# Patient Record
Sex: FEMALE | Race: WHITE | NOT HISPANIC OR LATINO | Employment: FULL TIME | URBAN - METROPOLITAN AREA
[De-identification: names, ages, dates, MRNs, and addresses within clinical notes are randomized per-mention and may not be internally consistent; named-entity substitution may affect disease eponyms.]

---

## 2023-10-17 ENCOUNTER — PROCEDURE VISIT (OUTPATIENT)
Dept: SLEEP MEDICINE | Facility: HOSPITAL | Age: 53
End: 2023-10-17
Payer: COMMERCIAL

## 2023-10-17 DIAGNOSIS — G47.33 OBSTRUCTIVE SLEEP APNEA (ADULT) (PEDIATRIC): ICD-10-CM

## 2023-10-17 PROCEDURE — 95811 POLYSOM 6/>YRS CPAP 4/> PARM: CPT | Performed by: INTERNAL MEDICINE

## 2023-10-18 VITALS — BODY MASS INDEX: 35.84 KG/M2 | HEIGHT: 61 IN | RESPIRATION RATE: 18 BRPM | HEART RATE: 75 BPM | WEIGHT: 189.82 LBS

## 2023-10-18 NOTE — PROGRESS NOTES
[facility] TECH NOTE:     Patient: Clarisa Barboza   MRN//AGE: [id number]  1970  53 y.o.   Technologist: Orly Zaldivar   Room: 2   Service Date: 10/18/2023        Sleep Testing Location: [facility]: 18    TECHNOLOGIST SLEEP STUDY PROCEDURE NOTE:   This sleep study is being conducted according to the policies and procedures outlined by the AAS accreditation standards.  The sleep study procedure and processes involved during this appointment was explained to the patient/patient’s family, questions were answered. The patient/family verbalized understanding.      The patient is a 53 y.o. year old female scheduled for a Diagnostic PSG Split night with montage of: PSG She arrived for her appointment.      The study that was ultimately completed was aDiagnostic PSG Split night with montage of: PSG    The full study was completed.  Patient questionnaires completed?: yes     Consents signed? yes    Initial Fall Risk Screening:     Clarisa has not fallen in the last 6 months. She did not result in injury. Clarisa does not have a fear of falling. She does not need assistance with sitting, standing, or walking. She does not need assistance walking in her home. She does not need assistance in an unfamiliar setting. The patient is not using an assistive device.     Brief Study observations: The patient was diagnosed with sleep apnea over a year ago. She is not currently using CPAP. The patient was referred for a split night study. After two hours of sleep, the patient's RDI was greater then 10. Per Protocol, the patient qualified to be split. Pressure was started at 5 cm. The final pressure was 12 cm.     Deviation to order/protocol and reason: Split night study      If PAP, which was preferred mask/pressure/mode: Bryan Paykell britany medium full face mask       Other:None    After the procedure, the patient/family was informed to ensure followup with ordering clinician for testing results.      Technologist: Orly  Zen RUST

## 2023-10-20 ENCOUNTER — PHARMACY VISIT (OUTPATIENT)
Dept: PHARMACY | Facility: CLINIC | Age: 53
End: 2023-10-20
Payer: COMMERCIAL

## 2023-10-20 RX ORDER — METHYLPHENIDATE HYDROCHLORIDE 36 MG/1
72 TABLET ORAL EVERY MORNING
Qty: 25 TABLET | Refills: 0 | OUTPATIENT
Start: 2023-10-20 | End: 2023-10-24 | Stop reason: SDUPTHER

## 2023-10-24 ENCOUNTER — OFFICE VISIT (OUTPATIENT)
Dept: PRIMARY CARE | Facility: CLINIC | Age: 53
End: 2023-10-24
Payer: COMMERCIAL

## 2023-10-24 ENCOUNTER — ANCILLARY PROCEDURE (OUTPATIENT)
Dept: RADIOLOGY | Facility: CLINIC | Age: 53
End: 2023-10-24
Payer: COMMERCIAL

## 2023-10-24 VITALS
BODY MASS INDEX: 36.53 KG/M2 | WEIGHT: 193 LBS | HEART RATE: 87 BPM | DIASTOLIC BLOOD PRESSURE: 70 MMHG | OXYGEN SATURATION: 97 % | TEMPERATURE: 97.3 F | SYSTOLIC BLOOD PRESSURE: 130 MMHG

## 2023-10-24 DIAGNOSIS — M25.539 PAIN IN WRIST, UNSPECIFIED LATERALITY: ICD-10-CM

## 2023-10-24 DIAGNOSIS — S62.102A CLOSED FRACTURE OF LEFT WRIST, INITIAL ENCOUNTER: ICD-10-CM

## 2023-10-24 DIAGNOSIS — F31.81 BIPOLAR II DISORDER (MULTI): ICD-10-CM

## 2023-10-24 DIAGNOSIS — M25.539 PAIN IN WRIST, UNSPECIFIED LATERALITY: Primary | ICD-10-CM

## 2023-10-24 PROBLEM — E78.2 MIXED HYPERLIPIDEMIA: Status: ACTIVE | Noted: 2023-10-24

## 2023-10-24 PROBLEM — G47.33 OSA (OBSTRUCTIVE SLEEP APNEA): Status: ACTIVE | Noted: 2023-10-24

## 2023-10-24 PROBLEM — E55.9 VITAMIN D DEFICIENCY: Status: ACTIVE | Noted: 2023-10-24

## 2023-10-24 PROCEDURE — 1036F TOBACCO NON-USER: CPT | Performed by: STUDENT IN AN ORGANIZED HEALTH CARE EDUCATION/TRAINING PROGRAM

## 2023-10-24 PROCEDURE — 73110 X-RAY EXAM OF WRIST: CPT | Mod: LT,FY

## 2023-10-24 PROCEDURE — 99213 OFFICE O/P EST LOW 20 MIN: CPT | Performed by: STUDENT IN AN ORGANIZED HEALTH CARE EDUCATION/TRAINING PROGRAM

## 2023-10-24 RX ORDER — METHYLPHENIDATE HYDROCHLORIDE 36 MG/1
72 TABLET ORAL EVERY MORNING
Refills: 0
Start: 2023-10-24 | End: 2023-12-04 | Stop reason: WASHOUT

## 2023-10-24 RX ORDER — MULTIVITAMIN/IRON/FOLIC ACID 18MG-0.4MG
1 TABLET ORAL DAILY
COMMUNITY

## 2023-10-24 RX ORDER — OLANZAPINE 5 MG/1
TABLET, ORALLY DISINTEGRATING ORAL
Start: 2023-10-24 | End: 2023-12-14 | Stop reason: ALTCHOICE

## 2023-10-24 RX ORDER — TOPIRAMATE 25 MG/1
75 TABLET ORAL NIGHTLY
Qty: 9 TABLET | Refills: 0
Start: 2023-10-24

## 2023-10-24 RX ORDER — DULOXETIN HYDROCHLORIDE 60 MG/1
60 CAPSULE, DELAYED RELEASE ORAL 2 TIMES DAILY
Start: 2023-10-24 | End: 2023-12-14 | Stop reason: ALTCHOICE

## 2023-10-24 RX ORDER — LATANOPROST 50 UG/ML
1 SOLUTION/ DROPS OPHTHALMIC DAILY
COMMUNITY

## 2023-10-24 RX ORDER — BUPROPION HYDROCHLORIDE 300 MG/1
300 TABLET ORAL DAILY
COMMUNITY
End: 2023-10-24 | Stop reason: ALTCHOICE

## 2023-10-24 RX ORDER — ALPRAZOLAM 0.25 MG/1
0.25 TABLET ORAL NIGHTLY PRN
Start: 2023-10-24 | End: 2023-12-14 | Stop reason: ALTCHOICE

## 2023-10-24 RX ORDER — BUPROPION HYDROCHLORIDE 450 MG/1
450 TABLET, FILM COATED, EXTENDED RELEASE ORAL EVERY MORNING
Qty: 90 TABLET | Refills: 3 | Status: SHIPPED | OUTPATIENT
Start: 2023-10-24 | End: 2023-12-14 | Stop reason: ALTCHOICE

## 2023-10-24 RX ORDER — TOPIRAMATE 25 MG/1
25 TABLET ORAL DAILY
COMMUNITY
End: 2023-10-24 | Stop reason: SDUPTHER

## 2023-10-24 RX ORDER — TOPIRAMATE 50 MG/1
50 TABLET, FILM COATED ORAL DAILY
COMMUNITY
End: 2023-10-24 | Stop reason: SDUPTHER

## 2023-10-24 RX ORDER — ALPRAZOLAM 0.25 MG/1
0.25 TABLET ORAL NIGHTLY PRN
COMMUNITY
End: 2023-10-24 | Stop reason: SDUPTHER

## 2023-10-24 RX ORDER — OLANZAPINE 5 MG/1
5 TABLET, ORALLY DISINTEGRATING ORAL NIGHTLY
COMMUNITY
End: 2023-10-24 | Stop reason: SDUPTHER

## 2023-10-24 ASSESSMENT — PATIENT HEALTH QUESTIONNAIRE - PHQ9
1. LITTLE INTEREST OR PLEASURE IN DOING THINGS: NOT AT ALL
SUM OF ALL RESPONSES TO PHQ9 QUESTIONS 1 AND 2: 0
2. FEELING DOWN, DEPRESSED OR HOPELESS: NOT AT ALL

## 2023-10-24 ASSESSMENT — ENCOUNTER SYMPTOMS
GASTROINTESTINAL NEGATIVE: 1
RESPIRATORY NEGATIVE: 1
WEAKNESS: 0
LOSS OF SENSATION IN FEET: 0
CARDIOVASCULAR NEGATIVE: 1
ARTHRALGIAS: 1
CONSTITUTIONAL NEGATIVE: 1
DEPRESSION: 0
OCCASIONAL FEELINGS OF UNSTEADINESS: 0

## 2023-10-24 ASSESSMENT — PAIN SCALES - GENERAL: PAINLEVEL: 5

## 2023-10-24 NOTE — PROGRESS NOTES
[facility]: [facility]  PROGRESS NOTE      Clarisa Barboza is a 53 y.o. female that is presenting today for Follow-up.    Assessment/Plan   Diagnoses and all orders for this visit:  Pain in wrist, unspecified laterality  Comments:  Check XR. If fracture noted, will send to ortho. Otherwise, just needs time.  Orders:  -     XR wrist left 3+ views; Future  -     Referral to Orthopaedic Surgery; Future  Bipolar II disorder (CMS/[facility])  Comments:  Medication reconciliation done during appointment. WIll attempt to remove duplicates.  Orders:  -     OLANZapine zydis (ZyPREXA) 5 mg disintegrating tablet; Take 1 tablet (5 mg) by mouth once daily with a meal AND 1 tablet (5 mg) once daily at noon. Take with meals AND 2 tablets (10 mg) once daily at bedtime.  -     methylphenidate (Concerta) 36 mg daily tablet; Take 2 tablets (72 mg) by mouth once daily in the morning.  -     DULoxetine (Cymbalta) 60 mg DR capsule; Take 1 capsule (60 mg) by mouth 2 times a day.  -     buPROPion XL (Forfivo XL) 450 mg 24 hr tablet; Take 1 tablet (450 mg) by mouth once daily in the morning. Do not crush, chew, or split.  -     ALPRAZolam (Xanax) 0.25 mg tablet; Take 1 tablet (0.25 mg) by mouth as needed at bedtime for anxiety or sleep.  -     topiramate (Topamax) 25 mg tablet; Take 3 tablets (75 mg) by mouth once daily at bedtime.  Closed fracture of left wrist, initial encounter  -     Referral to Orthopaedic Surgery; Future  ADDENDUM: XR results show that the patient has a nondisplaced fracture of her wrist. Will refer to orthopedic surgery.    Subjective   L wrist pain. Fell on it. Two-three weeks ago. Not improving.      Review of Systems   Constitutional: Negative.    Respiratory: Negative.     Cardiovascular: Negative.    Gastrointestinal: Negative.    Musculoskeletal:  Positive for arthralgias.   Neurological:  Negative for weakness.      Objective   Vitals:    10/24/23 0803   BP: 130/70   Pulse: 87   Temp: 36.3 °C  "(97.3 °F)   SpO2: 97%      Body mass index is 36.53 kg/m².  Physical Exam  Constitutional:       General: She is not in acute distress.  Neck:      Vascular: No carotid bruit.   Cardiovascular:      Rate and Rhythm: Normal rate and regular rhythm.      Heart sounds: Normal heart sounds.   Pulmonary:      Effort: Pulmonary effort is normal.      Breath sounds: Normal breath sounds.   Musculoskeletal:         General: No swelling.   Neurological:      Mental Status: She is alert. Mental status is at baseline.   Psychiatric:         Mood and Affect: Mood normal.       Diagnostic Results   No results found for: \"GLUCOSE\", \"CALCIUM\", \"NA\", \"K\", \"CO2\", \"CL\", \"BUN\", \"CREATININE\"  No results found for: \"ALT\", \"AST\", \"GGT\", \"ALKPHOS\", \"BILITOT\"  No results found for: \"WBC\", \"HGB\", \"HCT\", \"MCV\", \"PLT\"  No results found for: \"CHOL\"  No results found for: \"HDL\"  No results found for: \"LDLCALC\"  No results found for: \"TRIG\"  No components found for: \"CHOLHDL\"  No results found for: \"HGBA1C\"  Other labs not included in the list above were reviewed either before or during this encounter.    History    Past Medical History:   Diagnosis Date    Cataract 2022    Personal history of other diseases of the nervous system and sense organs     History of sleep apnea     Past Surgical History:   Procedure Laterality Date    CARPAL TUNNEL RELEASE  06/10/2013    Neuroplasty Decompression Median Nerve At Carpal Tunnel    HAND SURGERY  06/10/2013    Hand Surgery                                                                                                                                                          MOUTH SURGERY  06/10/2013    Oral Surgery Tooth Extraction    TONSILLECTOMY  06/10/2013    Tonsillectomy    WISDOM TOOTH EXTRACTION  01/01/1989     Family History   Problem Relation Name Age of Onset    Arthritis Mother Mom     Depression Mother Mom     Hearing loss Mother Mom     Alcohol abuse Father Dad     Depression Father Dad     " Hearing loss Father Dad     Heart disease Father Dad     Depression Maternal Grandmother Cayla     Hearing loss Paternal Grandfather Grandpa     Heart disease Paternal Grandfather Grandpa     Cancer Paternal Grandmother Louisa     Cancer Mother's Sister Aunt Gladys      Social History     Socioeconomic History    Marital status:      Spouse name: Not on file    Number of children: Not on file    Years of education: Not on file    Highest education level: Not on file   Occupational History    Not on file   Tobacco Use    Smoking status: Never    Smokeless tobacco: Never   Substance and Sexual Activity    Alcohol use: Never    Drug use: Never    Sexual activity: Not on file   Other Topics Concern    Not on file   Social History Narrative    Not on file     Social Determinants of Health     Financial Resource Strain: Not on file   Food Insecurity: Not on file   Transportation Needs: Not on file   Physical Activity: Not on file   Stress: Not on file   Social Connections: Not on file   Intimate Partner Violence: Not on file   Housing Stability: Not on file     Allergies   Allergen Reactions    Sulfa (Sulfonamide Antibiotics) Hives     Current Outpatient Medications on File Prior to Visit   Medication Sig Dispense Refill    b complex 0.4 mg tablet Take 1 tablet by mouth once daily.      latanoprost (Xalatan) 0.005 % ophthalmic solution Administer 1 drop into both eyes once daily.      vit A/vit C/vit E/zinc/copper (ICAPS AREDS ORAL) Take 500 mg by mouth once daily.      [DISCONTINUED] ALPRAZolam (Xanax) 0.25 mg tablet Take 1 tablet (0.25 mg) by mouth as needed at bedtime for anxiety.      [DISCONTINUED] buPROPion XL (Wellbutrin XL) 150 mg 24 hr tablet TAKE 1 TABLET BY MOUTH ONE TIME DAILY WITH 300 MG TABLET 30 tablet 3    [DISCONTINUED] buPROPion XL (Wellbutrin XL) 300 mg 24 hr tablet Take 1 tablet (300 mg) by mouth once daily. Do not crush, chew, or split.      [DISCONTINUED] DULoxetine (Cymbalta) 60 mg DR capsule TAKE  1 CAPSULE BY MOUTH TWO TIMES A DAY 60 capsule 3    [DISCONTINUED] methylphenidate (Concerta) 36 mg daily tablet TAKE 2 TABLETS BY MOUTH EVERY MORNING 25 tablet 0    [DISCONTINUED] methylphenidate (Concerta) 36 mg daily tablet Take 2 tablets (72 mg) by mouth once daily in the morning. 25 tablet 0    [DISCONTINUED] OLANZapine (ZyPREXA) 10 mg tablet TAKE 1 TABLET BY MOUTH AT BEDTIME 30 tablet 1    [DISCONTINUED] OLANZapine zydis (ZyPREXA) 5 mg disintegrating tablet Take 1 tablet (5 mg) by mouth once daily at bedtime.      [DISCONTINUED] topiramate (Topamax) 25 mg tablet Take 1 tablet (25 mg) by mouth once daily.      [DISCONTINUED] topiramate (Topamax) 50 mg tablet Take 1 tablet (50 mg) by mouth once daily.      [DISCONTINUED] methylphenidate (Concerta) 36 mg daily tablet TAKE 1 TABLET BY MOUTH TWO TIMES A DAY (MORNING & AFTERNOON) 60 tablet 0     No current facility-administered medications on file prior to visit.     Immunization History   Administered Date(s) Administered    Pfizer Purple Cap SARS-CoV-2 05/18/2021, 06/15/2021, 12/17/2021     Patient's medical history was reviewed and updated either before or during this encounter.    Sumeet Carmichael MD

## 2023-10-24 NOTE — PATIENT INSTRUCTIONS
Diagnoses and all orders for this visit:  Pain in wrist, unspecified laterality  Comments:  Check XR. If fracture noted, will send to ortho. Otherwise, just needs time.  Orders:  -     XR wrist left 3+ views; Future  Bipolar II disorder (CMS/[facility])  Comments:  Medication reconciliation done during appointment. WIll attempt to remove duplicates.  Orders:  -     OLANZapine zydis (ZyPREXA) 5 mg disintegrating tablet; Take 1 tablet (5 mg) by mouth once daily with a meal AND 1 tablet (5 mg) once daily at noon. Take with meals AND 2 tablets (10 mg) once daily at bedtime.  -     methylphenidate (Concerta) 36 mg daily tablet; Take 2 tablets (72 mg) by mouth once daily in the morning.  -     DULoxetine (Cymbalta) 60 mg DR capsule; Take 1 capsule (60 mg) by mouth 2 times a day.  -     buPROPion XL (Forfivo XL) 450 mg 24 hr tablet; Take 1 tablet (450 mg) by mouth once daily in the morning. Do not crush, chew, or split.  -     ALPRAZolam (Xanax) 0.25 mg tablet; Take 1 tablet (0.25 mg) by mouth as needed at bedtime for anxiety or sleep.  -     topiramate (Topamax) 25 mg tablet; Take 3 tablets (75 mg) by mouth once daily at bedtime.

## 2023-10-27 ENCOUNTER — PHARMACY VISIT (OUTPATIENT)
Dept: PHARMACY | Facility: CLINIC | Age: 53
End: 2023-10-27
Payer: COMMERCIAL

## 2023-10-27 PROCEDURE — RXMED WILLOW AMBULATORY MEDICATION CHARGE

## 2023-10-27 RX ORDER — METHYLPHENIDATE HYDROCHLORIDE 36 MG/1
72 TABLET ORAL EVERY MORNING
Qty: 25 TABLET | Refills: 0 | OUTPATIENT
Start: 2023-10-27 | End: 2023-11-09 | Stop reason: SDUPTHER

## 2023-10-28 PROBLEM — E66.9 OBESITY (BMI 30-39.9): Status: ACTIVE | Noted: 2023-10-28

## 2023-10-28 PROBLEM — R20.0 NUMBNESS: Status: ACTIVE | Noted: 2023-10-28

## 2023-10-28 PROBLEM — E88.819 INSULIN RESISTANCE: Status: ACTIVE | Noted: 2023-10-28

## 2023-10-28 RX ORDER — LAMOTRIGINE 200 MG/1
TABLET ORAL
COMMUNITY
End: 2023-12-14 | Stop reason: ALTCHOICE

## 2023-10-30 ENCOUNTER — OFFICE VISIT (OUTPATIENT)
Dept: ORTHOPEDIC SURGERY | Facility: CLINIC | Age: 53
End: 2023-10-30
Payer: COMMERCIAL

## 2023-10-30 DIAGNOSIS — M25.539 PAIN IN WRIST, UNSPECIFIED LATERALITY: ICD-10-CM

## 2023-10-30 DIAGNOSIS — S62.347A NONDISPLACED FRACTURE OF BASE OF FIFTH METACARPAL BONE, LEFT HAND, INITIAL ENCOUNTER FOR CLOSED FRACTURE: Primary | ICD-10-CM

## 2023-10-30 DIAGNOSIS — S62.102A CLOSED FRACTURE OF LEFT WRIST, INITIAL ENCOUNTER: ICD-10-CM

## 2023-10-30 PROCEDURE — 1036F TOBACCO NON-USER: CPT | Performed by: STUDENT IN AN ORGANIZED HEALTH CARE EDUCATION/TRAINING PROGRAM

## 2023-10-30 PROCEDURE — 26600 TREAT METACARPAL FRACTURE: CPT | Performed by: STUDENT IN AN ORGANIZED HEALTH CARE EDUCATION/TRAINING PROGRAM

## 2023-10-30 PROCEDURE — 99203 OFFICE O/P NEW LOW 30 MIN: CPT | Performed by: STUDENT IN AN ORGANIZED HEALTH CARE EDUCATION/TRAINING PROGRAM

## 2023-10-30 PROCEDURE — L3984 UPPER EXT FX ORTHOSIS WRIST: HCPCS | Performed by: STUDENT IN AN ORGANIZED HEALTH CARE EDUCATION/TRAINING PROGRAM

## 2023-10-30 ASSESSMENT — PAIN DESCRIPTION - DESCRIPTORS: DESCRIPTORS: ACHING

## 2023-10-30 ASSESSMENT — PAIN SCALES - GENERAL: PAINLEVEL_OUTOF10: 3

## 2023-10-30 ASSESSMENT — PAIN - FUNCTIONAL ASSESSMENT: PAIN_FUNCTIONAL_ASSESSMENT: 0-10

## 2023-10-30 NOTE — PROGRESS NOTES
History of Present Illness   Patient presents today for evaluation of side: left upper extremity pain.    The patient sustained an acute injury  approximately 4 weeks ago  .  The patient states she slipped and fell onto the ulnar side of her right hand.  She had immediate pain afterwards.  She did not seek medical care at that time and was referred to me by her primary care physician.  She has continued pain over the base of her fifth metacarpal.  She has not been immobilized.   The patient denies any loss of consciousness or additional significant injuries.  The patient denies any current numbness or tingling.  She is right-hand dominant.  The pain is sharp, acute in nature, better with rest worse with motion.     Past Medical History:   Diagnosis Date    Cataract 2022    Personal history of other diseases of the nervous system and sense organs     History of sleep apnea       Medication Documentation Review Audit       Reviewed by Janine Kimble MA (Medical Assistant) on 10/30/23 at 1523      Medication Order Taking? Sig Documenting Provider Last Dose Status   Discontinued 10/24/23 0827   ALPRAZolam (Xanax) 0.25 mg tablet [id number]  Take 1 tablet (0.25 mg) by mouth as needed at bedtime for anxiety or sleep. Sumeet Carmichael MD  Active   b complex 0.4 mg tablet [id number] No Take 1 tablet by mouth once daily. Historical Provider, MD Taking Active   buPROPion XL (Forfivo XL) 450 mg 24 hr tablet [id number]  Take 1 tablet (450 mg) by mouth once daily in the morning. Do not crush, chew, or split. Sumeet Carmichael MD  Active    Discontinued 10/24/23 0825   Discontinued 10/24/23 0827    Discontinued 10/24/23 0827   DULoxetine (Cymbalta) 60 mg DR capsule [id number]  Take 1 capsule (60 mg) by mouth 2 times a day. Sumeet Carmichael MD  Active   lamoTRIgine (LaMICtal) 200 mg tablet [id number]   Historical Provider, MD  Active   latanoprost (Xalatan) 0.005 % ophthalmic solution [id number] No Administer 1 drop into both  eyes once daily. Historical Provider, MD Taking Active   Discontinued 10/24/23 0808      Discontinued 10/24/23 0808    Discontinued 10/24/23 0827   methylphenidate (Concerta) 36 mg daily tablet [id number]  Take 2 tablets (72 mg) by mouth once daily in the morning. Sumeet Carmichael MD  Active   methylphenidate (Concerta) 36 mg daily tablet [id number]  Take 2 tablets (72 mg) by mouth once daily in the morning.   Active    Discontinued 10/24/23 0823   Discontinued 10/24/23 0827   OLANZapine zydis (ZyPREXA) 5 mg disintegrating tablet [id number]  Take 1 tablet (5 mg) by mouth once daily with a meal AND 1 tablet (5 mg) once daily at noon. Take with meals AND 2 tablets (10 mg) once daily at bedtime. Sumeet Carmichael MD  Active   Discontinued 10/24/23 0827   topiramate (Topamax) 25 mg tablet [id number]  Take 3 tablets (75 mg) by mouth once daily at bedtime. Sumeet Carmichael MD  Active   Discontinued 10/24/23 0827   vit A/vit C/vit E/zinc/copper (ICAPS AREDS ORAL) [id number] No Take 500 mg by mouth once daily. Historical Provider, MD Taking Active                    Allergies   Allergen Reactions    Sulfa (Sulfonamide Antibiotics) Hives       Social History     Socioeconomic History    Marital status:      Spouse name: Not on file    Number of children: Not on file    Years of education: Not on file    Highest education level: Not on file   Occupational History    Not on file   Tobacco Use    Smoking status: Never    Smokeless tobacco: Never   Substance and Sexual Activity    Alcohol use: Never    Drug use: Never    Sexual activity: Not on file   Other Topics Concern    Not on file   Social History Narrative    Not on file     Social Determinants of Health     Financial Resource Strain: Not on file   Food Insecurity: Not on file   Transportation Needs: Not on file   Physical Activity: Not on file   Stress: Not on file   Social Connections: Not on file   Intimate Partner Violence: Not on file   Housing Stability: Not on  file       Past Surgical History:   Procedure Laterality Date    CARPAL TUNNEL RELEASE  06/10/2013    Neuroplasty Decompression Median Nerve At Carpal Tunnel    HAND SURGERY  06/10/2013    Hand Surgery                                                                                                                                                          MOUTH SURGERY  06/10/2013    Oral Surgery Tooth Extraction    TONSILLECTOMY  06/10/2013    Tonsillectomy    WISDOM TOOTH EXTRACTION  01/01/1989          Review of Systems   GENERAL: Negative  GI: Negative  MUSCULOSKELETAL: See HPI  SKIN: Negative  NEURO:  Negative     Physical Exam:  side: left upper extremity:  Skin healthy to gross inspection, no breakdown  Tender to palpation to the base of the fifth metacarpal.  No swelling / ecchymosis noted  She is able to make a composite fist to the distal palmar crease with all fingers.  She is able to fully extend her MP and IP joints of the left hand.  Intact flexion and extension of 1st IP joint and finger abduction  Sensation intact to light touch medial / ulnar and radial nerve distribution   Good cap refill     Imaging  X-rays left wrist dated 10/24/2023 were reviewed in office today.  PA, lateral, oblique images demonstrate a nondisplaced fracture of the base of the fifth metacarpal.     Assessment   Patient with an acute side: left base of fifth metacarpal fracture      Plan:  Patient is approximately 4 weeks out from the above injury.  She continues to have pain.  She has not been immobilized.  We will give her an Exos brace to immobilize her fracture.  She should wear this at all times except for hygiene purposes.  She will follow-up in 4 weeks with repeat x-rays.  Discussed with the patient that this fracture is amenable to non-surgical management.  Discussed a period of immobilization and serial radiographs followed by rehab and return to activities.          Follow-up 1 month with repeat x-rays out of brace

## 2023-11-09 ENCOUNTER — PHARMACY VISIT (OUTPATIENT)
Dept: PHARMACY | Facility: CLINIC | Age: 53
End: 2023-11-09
Payer: COMMERCIAL

## 2023-11-09 PROCEDURE — RXMED WILLOW AMBULATORY MEDICATION CHARGE

## 2023-11-09 RX ORDER — METHYLPHENIDATE HYDROCHLORIDE 36 MG/1
72 TABLET ORAL EVERY MORNING
Qty: 25 TABLET | Refills: 0 | OUTPATIENT
Start: 2023-11-09 | End: 2023-11-15 | Stop reason: SDUPTHER

## 2023-11-15 ENCOUNTER — PHARMACY VISIT (OUTPATIENT)
Dept: PHARMACY | Facility: CLINIC | Age: 53
End: 2023-11-15
Payer: COMMERCIAL

## 2023-11-15 PROCEDURE — RXMED WILLOW AMBULATORY MEDICATION CHARGE

## 2023-11-15 RX ORDER — METHYLPHENIDATE HYDROCHLORIDE 36 MG/1
TABLET ORAL
Qty: 25 TABLET | Refills: 0 | OUTPATIENT
Start: 2023-11-15 | End: 2023-12-01 | Stop reason: SDUPTHER

## 2023-11-15 RX ORDER — DULOXETIN HYDROCHLORIDE 60 MG/1
60 CAPSULE, DELAYED RELEASE ORAL 2 TIMES DAILY
Qty: 60 CAPSULE | Refills: 3 | OUTPATIENT
Start: 2023-11-15 | End: 2024-11-14

## 2023-12-01 ENCOUNTER — PHARMACY VISIT (OUTPATIENT)
Dept: PHARMACY | Facility: CLINIC | Age: 53
End: 2023-12-01
Payer: COMMERCIAL

## 2023-12-01 PROCEDURE — RXMED WILLOW AMBULATORY MEDICATION CHARGE

## 2023-12-01 RX ORDER — METHYLPHENIDATE HYDROCHLORIDE 36 MG/1
72 TABLET ORAL EVERY MORNING
Qty: 25 TABLET | Refills: 0 | OUTPATIENT
Start: 2023-12-01 | End: 2023-12-06 | Stop reason: SDUPTHER

## 2023-12-04 ENCOUNTER — OFFICE VISIT (OUTPATIENT)
Dept: ORTHOPEDIC SURGERY | Facility: CLINIC | Age: 53
End: 2023-12-04
Payer: COMMERCIAL

## 2023-12-04 ENCOUNTER — OFFICE VISIT (OUTPATIENT)
Dept: SLEEP MEDICINE | Facility: CLINIC | Age: 53
End: 2023-12-04
Payer: COMMERCIAL

## 2023-12-04 ENCOUNTER — ANCILLARY PROCEDURE (OUTPATIENT)
Dept: RADIOLOGY | Facility: CLINIC | Age: 53
End: 2023-12-04
Payer: COMMERCIAL

## 2023-12-04 VITALS
SYSTOLIC BLOOD PRESSURE: 136 MMHG | DIASTOLIC BLOOD PRESSURE: 80 MMHG | WEIGHT: 189 LBS | OXYGEN SATURATION: 97 % | HEART RATE: 68 BPM | HEIGHT: 60 IN | BODY MASS INDEX: 37.11 KG/M2

## 2023-12-04 DIAGNOSIS — M79.10 MUSCULAR PAIN: ICD-10-CM

## 2023-12-04 DIAGNOSIS — G47.19 EXCESSIVE DAYTIME SLEEPINESS: ICD-10-CM

## 2023-12-04 DIAGNOSIS — M79.10 MUSCULAR PAIN: Primary | ICD-10-CM

## 2023-12-04 DIAGNOSIS — G47.33 OBSTRUCTIVE SLEEP APNEA (ADULT) (PEDIATRIC): Primary | ICD-10-CM

## 2023-12-04 PROCEDURE — 99214 OFFICE O/P EST MOD 30 MIN: CPT | Performed by: INTERNAL MEDICINE

## 2023-12-04 PROCEDURE — 99024 POSTOP FOLLOW-UP VISIT: CPT | Performed by: STUDENT IN AN ORGANIZED HEALTH CARE EDUCATION/TRAINING PROGRAM

## 2023-12-04 PROCEDURE — 1036F TOBACCO NON-USER: CPT | Performed by: INTERNAL MEDICINE

## 2023-12-04 PROCEDURE — 1036F TOBACCO NON-USER: CPT | Performed by: STUDENT IN AN ORGANIZED HEALTH CARE EDUCATION/TRAINING PROGRAM

## 2023-12-04 PROCEDURE — 73110 X-RAY EXAM OF WRIST: CPT | Mod: LT

## 2023-12-04 PROCEDURE — 73110 X-RAY EXAM OF WRIST: CPT | Mod: LEFT SIDE | Performed by: RADIOLOGY

## 2023-12-04 ASSESSMENT — SLEEP AND FATIGUE QUESTIONNAIRES
HOW LIKELY ARE YOU TO NOD OFF OR FALL ASLEEP WHILE WATCHING TV: HIGH CHANCE OF DOZING
HOW LIKELY ARE YOU TO NOD OFF OR FALL ASLEEP WHEN YOU ARE A PASSENGER IN A CAR FOR AN HOUR WITHOUT A BREAK: MODERATE CHANCE OF DOZING
HOW LIKELY ARE YOU TO NOD OFF OR FALL ASLEEP WHILE SITTING QUIETLY AFTER LUNCH WITHOUT ALCOHOL: HIGH CHANCE OF DOZING
SITING INACTIVE IN A PUBLIC PLACE LIKE A CLASS ROOM OR A MOVIE THEATER: MODERATE CHANCE OF DOZING
HOW LIKELY ARE YOU TO NOD OFF OR FALL ASLEEP IN A CAR, WHILE STOPPED FOR A FEW MINUTES IN TRAFFIC: MODERATE CHANCE OF DOZING
HOW LIKELY ARE YOU TO NOD OFF OR FALL ASLEEP WHILE SITTING AND TALKING TO SOMEONE: SLIGHT CHANCE OF DOZING
ESS-CHAD TOTAL SCORE: 19
HOW LIKELY ARE YOU TO NOD OFF OR FALL ASLEEP WHILE LYING DOWN TO REST IN THE AFTERNOON WHEN CIRCUMSTANCES PERMIT: HIGH CHANCE OF DOZING
HOW LIKELY ARE YOU TO NOD OFF OR FALL ASLEEP WHILE SITTING AND READING: HIGH CHANCE OF DOZING

## 2023-12-04 ASSESSMENT — PAIN - FUNCTIONAL ASSESSMENT: PAIN_FUNCTIONAL_ASSESSMENT: 0-10

## 2023-12-04 ASSESSMENT — PAIN SCALES - GENERAL
PAINLEVEL: 0-NO PAIN
PAINLEVEL_OUTOF10: 0 - NO PAIN

## 2023-12-04 NOTE — PROGRESS NOTES
"    Subjective   Patient ID: Clarisa Barboza is a 53 y.o. female who presents for Review Sleep Study Results.  HPI    Prior Sleep History:  9/14/2023: Office Visit: Dr. Melvin García: Evaluation for hypersomnolence.  Had CPAP previously lost weight and symptoms improved.  Patient since regained weight with resumption of symptoms.  Diagnosed with bipolar 2 and depression along with anxiety.  History of tonsillectomy ordered sleep study  10/17/2023: Split-night sleep study: AHI 3% 12, CPAP 11 cm H2O with Bryan and Paykel for a medium fullface mask    Current Sleep History:  Here to review the results of her sleep study.  She had a split-night 10/17/2023, AHI 12, CPAP 11 cm H2O.    ESS: 19     Review of Systems  Review of systems negative except as per HPI  Objective   /80   Pulse 68   Ht 1.524 m (5')   Wt 85.7 kg (189 lb)   SpO2 97%   BMI 36.91 kg/m²    Physical Exam  PHYSICAL EXAM: GENERAL: alert pleasant and cooperative no acute distress  PSYCH EXAM: alert,oriented, in NAD with a full range of affect, normal behavior and no psychotic features    No results found for: \"IRON\", \"TIBC\", \"FERRITIN\"     Assessment/Plan   Problem List Items Addressed This Visit             ICD-10-CM    Obstructive sleep apnea (adult) (pediatric) - Primary G47.33     - The patient has sleep apnea and requires treatment.  - Start  CPAP 11 cm H20 through [facility].  - Sleep apnea and PAP therapy education was provided at length in clinic today. Clarisa  verbalized understanding.  - Diet, exercise, and weight loss were emphasized today in clinic, as were non-supine sleep, avoiding alcohol in the late evening, and driving or operating heavy machinery when sleepy.   -Clarisa verbalized understanding.          Relevant Orders    Positive Airway Pressure (PAP) Therapy    Excessive daytime sleepiness G47.19     I removed the narcolepsy diagnosis from her problem list as we do not have objective evidence at this time on " whether she has this. We will reevaluate once BINH is managed and if still symptomatic, then we will perform a PSG/MSLT to evaluate for narcolepsy. EDS may be from untreated BINH

## 2023-12-04 NOTE — ASSESSMENT & PLAN NOTE
I removed the narcolepsy diagnosis from her problem list as we do not have objective evidence at this time on whether she has this. We will reevaluate once BINH is managed and if still symptomatic, then we will perform a PSG/MSLT to evaluate for narcolepsy. EDS may be from untreated BINH

## 2023-12-04 NOTE — PATIENT INSTRUCTIONS
"  Starting Positive Airway Pressure: You were ordered a device to wear when you sleep called PAP (Positive Airway Pressure) to treat your sleep apnea. The order will be submitted to a durable medical equipment company who will arrange setting you up with the device. They will provide all the necessary equipment and discuss use and maintenance of the device with you.     Please followup with us in 1-2 months of starting PAP to see how well it is working for you or to troubleshoot. Please bring your equipment to this initial followup visit.    **Please bring all PAP equipment with you to follow up appointments unless told otherwise.**     Important things to keep in mind as you start PAP:  Insurance will monitor your usage during the first 90 days.  You should use your PAP - \"all night, every night\", for your health. The bare minimum is to use your PAP device while sleeping = at least 4 hours per day at least 5 days per week. Otherwise, your PAP device may be reclaimed by your PAP vendor at 90 days.  There are many mask to choose from to wear with your PAP machine. If you are not comfortable with the first mask issued to you, call your DME and ask for another option to try. Some have a 30 day return policy.  Discuss with your provider if you are having issues breathing with the machine or the temperature or humidity feel uncomfortable  Expect to have an adjustment period when you start your device. It helps to continuing wearing the machine every day for a period of time until you get more used to it. You can practice with wearing the mask alone if you need, then add in the PAP air pressure a few days later.   Reach out for help if you are struggling! The sleep medicine department can be reached at [phone]  We encourage you to download data monitoring apps to your phone. For [employer] AirAIRVENDse 10/11 - MyAir jitendra. For [employer]erved - DreamMapper. Both are available in the J[employer] store for free and are a great " tool to monitor your progress with your CPAP night to night.

## 2023-12-04 NOTE — ASSESSMENT & PLAN NOTE
I reviewed the results of the sleep study with Clarisa .  - We discussed various treatment options available for obstructive sleep apnea including, positional therapy, topical nasal steroids when appropriate, medical mandibular advancement device (oral appliance), EPAP, CPAP, negative pressure options, daytime glossal stimulation, and surgical options   -Clarisa  would like to manage her  obstructive sleep apnea with PAP therapy  - Clarisa  has sleep apnea and requires treatment.  - Sleep apnea and PAP therapy education was provided at length in clinic today.   - Start  CPAP 11 cm H20 through [employer].  - Sleep apnea and PAP therapy education was provided at length in clinic today. Clarisa  verbalized understanding.  - Diet, exercise, and weight loss were emphasized today in clinic, as were non-supine sleep, avoiding alcohol in the late evening, and driving or operating heavy machinery when sleepy.   -Clarisa verbalized understanding.

## 2023-12-04 NOTE — PROGRESS NOTES
History of Present Illness   Patient presents today for evaluation of side: left upper extremity pain.    The patient sustained an acute injury  approximately 4 weeks ago  .  The patient states she slipped and fell onto the ulnar side of her right hand.  She had immediate pain afterwards.  She did not seek medical care at that time and was referred to me by her primary care physician.  She has continued pain over the base of her fifth metacarpal.  She has not been immobilized.   The patient denies any loss of consciousness or additional significant injuries.  The patient denies any current numbness or tingling.  She is right-hand dominant.  The pain is sharp, acute in nature, better with rest worse with motion.    Pt presents for follow up today. She is approximately 8 weeks from the above injury. She is doing well. Denies pain. She has been wearing her brace. She denies numbness and tingling.      Past Medical History:   Diagnosis Date    Cataract 2022    Personal history of other diseases of the nervous system and sense organs     History of sleep apnea       Medication Documentation Review Audit       Reviewed by Janine Kimble MA (Medical Assistant) on 12/04/23 at 1529      Medication Order Taking? Sig Documenting Provider Last Dose Status   ALPRAZolam (Xanax) 0.25 mg tablet [id number] No Take 1 tablet (0.25 mg) by mouth as needed at bedtime for anxiety or sleep. Sumeet Carmichael MD Taking Active   b complex 0.4 mg tablet [id number] No Take 1 tablet by mouth once daily. Historical Provider, MD Taking Active   buPROPion XL (Forfivo XL) 450 mg 24 hr tablet [id number] No Take 1 tablet (450 mg) by mouth once daily in the morning. Do not crush, chew, or split. Sumeet Carmichael MD Taking Active   DULoxetine (Cymbalta) 60 mg DR capsule [id number] No Take 1 capsule (60 mg) by mouth 2 times a day. Sumeet Carmichael MD Taking Active   DULoxetine (Cymbalta) 60 mg DR capsule [id number] No TAKE 1 CAPSULE BY MOUTH TWO  TIMES A DAY  Taking Active   lamoTRIgine (LaMICtal) 200 mg tablet [id number] No  Historical Provider, MD Taking Active   latanoprost (Xalatan) 0.005 % ophthalmic solution [id number] No Administer 1 drop into both eyes once daily. Historical Provider, MD Taking Active   Discontinued 12/04/23 1027      Discontinued 12/01/23 1238   methylphenidate ER (Concerta) 36 mg daily tablet [id number] No Take 2 tablets (72 mg) by mouth once daily in the morning.  Taking Active   OLANZapine zydis (ZyPREXA) 5 mg disintegrating tablet [id number] No Take 1 tablet (5 mg) by mouth once daily with a meal AND 1 tablet (5 mg) once daily at noon. Take with meals AND 2 tablets (10 mg) once daily at bedtime. Sumeet Carmichael MD Taking Active   topiramate (Topamax) 25 mg tablet [id number] No Take 3 tablets (75 mg) by mouth once daily at bedtime. Sumeet Carmichael MD Taking Active   vit A/vit C/vit E/zinc/copper (ICAPS AREDS ORAL) [id number] No Take 500 mg by mouth once daily. Historical Provider, MD Taking Active                    Allergies   Allergen Reactions    Sulfa (Sulfonamide Antibiotics) Hives       Social History     Socioeconomic History    Marital status:      Spouse name: Not on file    Number of children: Not on file    Years of education: Not on file    Highest education level: Not on file   Occupational History    Not on file   Tobacco Use    Smoking status: Never    Smokeless tobacco: Never   Substance and Sexual Activity    Alcohol use: Never    Drug use: Never    Sexual activity: Not on file   Other Topics Concern    Not on file   Social History Narrative    Not on file     Social Determinants of Health     Financial Resource Strain: Not on file   Food Insecurity: Not on file   Transportation Needs: Not on file   Physical Activity: Not on file   Stress: Not on file   Social Connections: Not on file   Intimate Partner Violence: Not on file   Housing Stability: Not on file       Past Surgical History:   Procedure Laterality  Date    CARPAL TUNNEL RELEASE  06/10/2013    Neuroplasty Decompression Median Nerve At Carpal Tunnel    HAND SURGERY  06/10/2013    Hand Surgery                                                                                                                                                          MOUTH SURGERY  06/10/2013    Oral Surgery Tooth Extraction    TONSILLECTOMY  06/10/2013    Tonsillectomy    WISDOM TOOTH EXTRACTION  01/01/1989          Review of Systems   GENERAL: Negative  GI: Negative  MUSCULOSKELETAL: See HPI  SKIN: Negative  NEURO:  Negative     Physical Exam:  side: left upper extremity:  Skin healthy to gross inspection, no breakdown  Non Tender to palpation to the base of the fifth metacarpal.  No swelling / ecchymosis noted  She is able to make a composite fist to the distal palmar crease with all fingers.  She is able to fully extend her MP and IP joints of the left hand.  Intact flexion and extension of 1st IP joint and finger abduction  Sensation intact to light touch medial / ulnar and radial nerve distribution   Good cap refill     Imaging  X-rays left wrist taken in office today dayed 12/4/2023 and compared to xrays dated 10/24/2023 were reviewed in office today.  PA, lateral, oblique images demonstrate a nondisplaced fracture of the base of the fifth metacarpal. Bridging callous     Assessment   Patient with an side: left base of fifth metacarpal fracture      Plan:  Patient is approximately 8  weeks out from the above injury.  She continues to have pain.  She may discontinue splinting at this time.  She may gradually return to normal activities.  She may follow-up as needed.       Follow-up as needed

## 2023-12-06 PROCEDURE — RXMED WILLOW AMBULATORY MEDICATION CHARGE

## 2023-12-14 ENCOUNTER — OFFICE VISIT (OUTPATIENT)
Dept: SLEEP MEDICINE | Facility: CLINIC | Age: 53
End: 2023-12-14
Payer: COMMERCIAL

## 2023-12-14 VITALS
HEIGHT: 66 IN | BODY MASS INDEX: 30.53 KG/M2 | OXYGEN SATURATION: 98 % | DIASTOLIC BLOOD PRESSURE: 74 MMHG | HEART RATE: 94 BPM | WEIGHT: 190 LBS | SYSTOLIC BLOOD PRESSURE: 112 MMHG

## 2023-12-14 DIAGNOSIS — G47.19 EXCESSIVE DAYTIME SLEEPINESS: ICD-10-CM

## 2023-12-14 DIAGNOSIS — G47.30 SLEEP APNEA, UNSPECIFIED TYPE: Primary | ICD-10-CM

## 2023-12-14 DIAGNOSIS — E66.9 OBESITY (BMI 30-39.9): ICD-10-CM

## 2023-12-14 PROCEDURE — RXMED WILLOW AMBULATORY MEDICATION CHARGE

## 2023-12-14 PROCEDURE — 1036F TOBACCO NON-USER: CPT | Performed by: INTERNAL MEDICINE

## 2023-12-14 PROCEDURE — 99214 OFFICE O/P EST MOD 30 MIN: CPT | Performed by: INTERNAL MEDICINE

## 2023-12-14 ASSESSMENT — SLEEP AND FATIGUE QUESTIONNAIRES
HOW LIKELY ARE YOU TO NOD OFF OR FALL ASLEEP WHILE SITTING AND TALKING TO SOMEONE: WOULD NEVER DOZE
HOW LIKELY ARE YOU TO NOD OFF OR FALL ASLEEP IN A CAR, WHILE STOPPED FOR A FEW MINUTES IN TRAFFIC: SLIGHT CHANCE OF DOZING
HOW LIKELY ARE YOU TO NOD OFF OR FALL ASLEEP WHILE SITTING AND READING: HIGH CHANCE OF DOZING
HOW LIKELY ARE YOU TO NOD OFF OR FALL ASLEEP WHILE WATCHING TV: MODERATE CHANCE OF DOZING
HOW LIKELY ARE YOU TO NOD OFF OR FALL ASLEEP WHILE SITTING QUIETLY AFTER LUNCH WITHOUT ALCOHOL: HIGH CHANCE OF DOZING
HOW LIKELY ARE YOU TO NOD OFF OR FALL ASLEEP WHILE LYING DOWN TO REST IN THE AFTERNOON WHEN CIRCUMSTANCES PERMIT: HIGH CHANCE OF DOZING
SITING INACTIVE IN A PUBLIC PLACE LIKE A CLASS ROOM OR A MOVIE THEATER: WOULD NEVER DOZE
HOW LIKELY ARE YOU TO NOD OFF OR FALL ASLEEP WHEN YOU ARE A PASSENGER IN A CAR FOR AN HOUR WITHOUT A BREAK: HIGH CHANCE OF DOZING
ESS-CHAD TOTAL SCORE: 15

## 2023-12-14 ASSESSMENT — PAIN SCALES - GENERAL: PAINLEVEL: 0-NO PAIN

## 2023-12-14 NOTE — PROGRESS NOTES
"    Subjective   Patient ID: Clarisa Barboza is a 53 y.o. female who presents for review of sleep study  HPI    Prior Sleep History:  9/14/2023: Office Visit: Dr. Melvin García:.  Patient previously had sleep apnea lost weight became asymptomatic.  Regained weight and now has resumption of symptoms.  History of tonsillectomy  10/17/2023: Split-night sleep study: RDI 3% 12, RDI 4% 3.3, CPAP 11 cm H2O    Current Sleep History:  Presents to review the results of her sleep study.  Clarisa presents for a review of her sleep study results.  She ended up getting a split-night sleep study was unaware that her insurance recently changed to following the CMS 4% desaturation scoring rule for sleep apnea.  Patient found that she responded well to the CPAP and is disappointed that she cannot be started on therapy due to her excessive daytime sleepiness.  We did discuss that this may be due to night to night variability.  She did have a high amount of slow-wave sleep which may falsely reduce her severity of obstructive sleep apnea.  She also spending majority of the time sleeping in a nonsupine position which did appear to reduce her events.  She did appear to have worsening of her sleep apnea when supine.  She had abnormal sleep architecture which was predominantly slow-wave sleep during the diagnostic portion of the study.  She did respond to CPAP of 11 cm H2O during titration.  We did review alternative therapies and she is open to trialing using a positional device to prevent supine sleep to see if she responds and has less daytime sleepiness    ESS: 15     Review of Systems  Review of systems negative except as per HPI  Objective   /74   Pulse 94   Ht 1.676 m (5' 6\")   Wt 86.2 kg (190 lb)   SpO2 98%   BMI 30.67 kg/m²    Physical Exam  PHYSICAL EXAM: GENERAL: alert pleasant and cooperative no acute distress  PSYCH EXAM: alert,oriented, in NAD with a full range of affect, normal behavior and no psychotic " "features    No results found for: \"IRON\", \"TIBC\", \"FERRITIN\"     Assessment/Plan   Problem List Items Addressed This Visit             ICD-10-CM    Sleep apnea - Primary G47.30     She did not meet 4% criteria.  She is going to trial positional therapy for a few months to see if this is of any clinical benefit.  If not then we did agree to repeat testing with PSG followed by MSLT.  Patient is recommended to sleep in a supine position for the duration of the test         Obesity (BMI 30-39.9) E66.9    Excessive daytime sleepiness G47.19     This may be multifactorial may include undiagnosed sleep apnea.  Is suspect that her sleep study may be a false negative due to increased slow-wave sleep and decreased amount of supine sleep with no REM observed during the diagnostic portion of the study.  We did discuss night to night variability.  May be due to her medications also contributing to excessive daytime sleepiness and abnormal sleep architecture however these cannot be discontinued.  Clarisa and her spouse verbalized understanding               "

## 2023-12-14 NOTE — ASSESSMENT & PLAN NOTE
This may be multifactorial may include undiagnosed sleep apnea.  Is suspect that her sleep study may be a false negative due to increased slow-wave sleep and decreased amount of supine sleep with no REM observed during the diagnostic portion of the study.  We did discuss night to night variability.  May be due to her medications also contributing to excessive daytime sleepiness and abnormal sleep architecture however these cannot be discontinued.  Clarisa and her spouse verbalized understanding

## 2023-12-14 NOTE — PATIENT INSTRUCTIONS
Positional devices to help keep you from sleeping on your back would include the ZZOMA, a slumber bump, or wedge pillow. You thought that the ZZOMA would be the one you wanted to try. Will see you back in a few months to see how effective this is for you

## 2023-12-14 NOTE — ASSESSMENT & PLAN NOTE
She did not meet 4% criteria.  She is going to trial positional therapy for a few months to see if this is of any clinical benefit.  If not then we did agree to repeat testing with PSG followed by MSLT.  Patient is recommended to sleep in a supine position for the duration of the test

## 2023-12-16 ENCOUNTER — PHARMACY VISIT (OUTPATIENT)
Dept: PHARMACY | Facility: CLINIC | Age: 53
End: 2023-12-16
Payer: COMMERCIAL

## 2024-01-13 PROCEDURE — RXMED WILLOW AMBULATORY MEDICATION CHARGE

## 2024-01-16 ENCOUNTER — PHARMACY VISIT (OUTPATIENT)
Dept: PHARMACY | Facility: CLINIC | Age: 54
End: 2024-01-16
Payer: COMMERCIAL

## 2024-01-19 ENCOUNTER — PHARMACY VISIT (OUTPATIENT)
Dept: PHARMACY | Facility: CLINIC | Age: 54
End: 2024-01-19
Payer: COMMERCIAL

## 2024-01-19 PROCEDURE — RXMED WILLOW AMBULATORY MEDICATION CHARGE

## 2024-01-25 ENCOUNTER — PHARMACY VISIT (OUTPATIENT)
Dept: PHARMACY | Facility: CLINIC | Age: 54
End: 2024-01-25
Payer: COMMERCIAL

## 2024-01-25 PROCEDURE — RXMED WILLOW AMBULATORY MEDICATION CHARGE

## 2024-01-25 RX ORDER — TOPIRAMATE 25 MG/1
75 TABLET ORAL DAILY
Qty: 90 TABLET | Refills: 1 | OUTPATIENT
Start: 2024-01-25

## 2024-01-26 PROCEDURE — RXMED WILLOW AMBULATORY MEDICATION CHARGE

## 2024-01-30 PROCEDURE — RXMED WILLOW AMBULATORY MEDICATION CHARGE

## 2024-01-31 ENCOUNTER — PHARMACY VISIT (OUTPATIENT)
Dept: PHARMACY | Facility: CLINIC | Age: 54
End: 2024-01-31
Payer: COMMERCIAL

## 2024-02-12 PROCEDURE — RXMED WILLOW AMBULATORY MEDICATION CHARGE

## 2024-02-13 PROCEDURE — RXMED WILLOW AMBULATORY MEDICATION CHARGE

## 2024-02-15 ENCOUNTER — PHARMACY VISIT (OUTPATIENT)
Dept: PHARMACY | Facility: CLINIC | Age: 54
End: 2024-02-15
Payer: COMMERCIAL

## 2024-02-27 ENCOUNTER — PHARMACY VISIT (OUTPATIENT)
Dept: PHARMACY | Facility: CLINIC | Age: 54
End: 2024-02-27
Payer: COMMERCIAL

## 2024-02-27 PROCEDURE — RXMED WILLOW AMBULATORY MEDICATION CHARGE

## 2024-03-07 ENCOUNTER — APPOINTMENT (OUTPATIENT)
Dept: AUDIOLOGY | Facility: CLINIC | Age: 54
End: 2024-03-07
Payer: COMMERCIAL

## 2024-03-07 ENCOUNTER — APPOINTMENT (OUTPATIENT)
Dept: OTOLARYNGOLOGY | Facility: CLINIC | Age: 54
End: 2024-03-07
Payer: COMMERCIAL

## 2024-03-13 PROCEDURE — RXMED WILLOW AMBULATORY MEDICATION CHARGE

## 2024-03-14 ENCOUNTER — APPOINTMENT (OUTPATIENT)
Dept: SLEEP MEDICINE | Facility: CLINIC | Age: 54
End: 2024-03-14
Payer: COMMERCIAL

## 2024-03-16 ENCOUNTER — PHARMACY VISIT (OUTPATIENT)
Dept: PHARMACY | Facility: CLINIC | Age: 54
End: 2024-03-16
Payer: COMMERCIAL

## 2024-04-13 PROCEDURE — RXMED WILLOW AMBULATORY MEDICATION CHARGE

## 2024-04-16 ENCOUNTER — PHARMACY VISIT (OUTPATIENT)
Dept: PHARMACY | Facility: CLINIC | Age: 54
End: 2024-04-16
Payer: COMMERCIAL

## 2024-05-15 PROCEDURE — RXMED WILLOW AMBULATORY MEDICATION CHARGE

## 2024-05-16 ENCOUNTER — PHARMACY VISIT (OUTPATIENT)
Dept: PHARMACY | Facility: CLINIC | Age: 54
End: 2024-05-16
Payer: COMMERCIAL

## 2024-05-16 RX ORDER — METHYLPHENIDATE HYDROCHLORIDE 36 MG/1
72 TABLET ORAL EVERY MORNING
Qty: 60 TABLET | Refills: 0 | OUTPATIENT
Start: 2024-05-16

## 2024-06-14 ENCOUNTER — PHARMACY VISIT (OUTPATIENT)
Dept: PHARMACY | Facility: CLINIC | Age: 54
End: 2024-06-14
Payer: COMMERCIAL

## 2024-06-14 PROCEDURE — RXMED WILLOW AMBULATORY MEDICATION CHARGE

## 2024-07-18 PROCEDURE — RXMED WILLOW AMBULATORY MEDICATION CHARGE

## 2024-07-19 ENCOUNTER — PHARMACY VISIT (OUTPATIENT)
Dept: PHARMACY | Facility: CLINIC | Age: 54
End: 2024-07-19
Payer: COMMERCIAL

## 2024-07-26 ENCOUNTER — PHARMACY VISIT (OUTPATIENT)
Dept: PHARMACY | Facility: CLINIC | Age: 54
End: 2024-07-26
Payer: COMMERCIAL

## 2024-07-26 PROCEDURE — RXOTC WILLOW AMBULATORY OTC CHARGE

## 2024-07-26 PROCEDURE — RXMED WILLOW AMBULATORY MEDICATION CHARGE

## 2024-07-26 RX ORDER — DULOXETIN HYDROCHLORIDE 60 MG/1
60 CAPSULE, DELAYED RELEASE ORAL 2 TIMES DAILY
Qty: 60 CAPSULE | Refills: 0 | OUTPATIENT
Start: 2024-07-25

## 2024-08-22 PROCEDURE — RXMED WILLOW AMBULATORY MEDICATION CHARGE

## 2024-08-23 ENCOUNTER — PHARMACY VISIT (OUTPATIENT)
Dept: PHARMACY | Facility: CLINIC | Age: 54
End: 2024-08-23
Payer: COMMERCIAL

## 2024-08-30 ENCOUNTER — PHARMACY VISIT (OUTPATIENT)
Dept: PHARMACY | Facility: CLINIC | Age: 54
End: 2024-08-30
Payer: COMMERCIAL

## 2024-08-30 PROCEDURE — RXMED WILLOW AMBULATORY MEDICATION CHARGE

## 2024-08-30 RX ORDER — LURASIDONE HYDROCHLORIDE 20 MG/1
20 TABLET, FILM COATED ORAL
Qty: 7 TABLET | Refills: 0 | OUTPATIENT
Start: 2024-08-30

## 2024-08-30 RX ORDER — LAMOTRIGINE 200 MG/1
400 TABLET ORAL DAILY
Qty: 180 TABLET | Refills: 0 | OUTPATIENT
Start: 2024-08-30

## 2024-08-30 RX ORDER — DULOXETIN HYDROCHLORIDE 60 MG/1
60 CAPSULE, DELAYED RELEASE ORAL 2 TIMES DAILY
Qty: 180 CAPSULE | Refills: 1 | OUTPATIENT
Start: 2024-08-30

## 2024-08-30 RX ORDER — ALPRAZOLAM 0.25 MG/1
0.25 TABLET ORAL NIGHTLY PRN
Qty: 30 TABLET | Refills: 1 | OUTPATIENT
Start: 2024-08-30

## 2024-08-30 RX ORDER — BUPROPION HYDROCHLORIDE 300 MG/1
300 TABLET ORAL
Qty: 90 TABLET | Refills: 0 | OUTPATIENT
Start: 2024-08-30

## 2024-08-30 RX ORDER — TOPIRAMATE 50 MG/1
50 TABLET, FILM COATED ORAL 2 TIMES DAILY
Qty: 60 TABLET | Refills: 2 | OUTPATIENT
Start: 2024-08-30

## 2024-08-30 RX ORDER — LURASIDONE HYDROCHLORIDE 40 MG/1
40 TABLET, FILM COATED ORAL DAILY
Qty: 30 TABLET | Refills: 1 | OUTPATIENT
Start: 2024-08-30

## 2024-08-30 RX ORDER — BUPROPION HYDROCHLORIDE 150 MG/1
150 TABLET ORAL
Qty: 90 TABLET | Refills: 1 | OUTPATIENT
Start: 2024-08-30

## 2024-08-30 RX ORDER — METHYLPHENIDATE HYDROCHLORIDE 36 MG/1
72 TABLET ORAL
Qty: 60 TABLET | Refills: 0 | OUTPATIENT
Start: 2024-08-30

## 2024-08-30 RX ORDER — OLANZAPINE 2.5 MG/1
2.5 TABLET ORAL 2 TIMES DAILY
Qty: 60 TABLET | Refills: 1 | OUTPATIENT
Start: 2024-08-30

## 2024-08-31 PROCEDURE — RXMED WILLOW AMBULATORY MEDICATION CHARGE

## 2024-09-03 ENCOUNTER — PHARMACY VISIT (OUTPATIENT)
Dept: PHARMACY | Facility: CLINIC | Age: 54
End: 2024-09-03
Payer: COMMERCIAL

## 2024-09-03 PROCEDURE — RXOTC WILLOW AMBULATORY OTC CHARGE

## 2024-09-04 PROCEDURE — RXMED WILLOW AMBULATORY MEDICATION CHARGE

## 2024-09-05 ENCOUNTER — PHARMACY VISIT (OUTPATIENT)
Dept: PHARMACY | Facility: CLINIC | Age: 54
End: 2024-09-05
Payer: COMMERCIAL

## 2024-09-05 PROCEDURE — RXOTC WILLOW AMBULATORY OTC CHARGE

## 2024-09-11 PROCEDURE — RXMED WILLOW AMBULATORY MEDICATION CHARGE

## 2024-09-12 ENCOUNTER — PHARMACY VISIT (OUTPATIENT)
Dept: PHARMACY | Facility: CLINIC | Age: 54
End: 2024-09-12
Payer: COMMERCIAL

## 2024-09-26 ENCOUNTER — PHARMACY VISIT (OUTPATIENT)
Dept: PHARMACY | Facility: CLINIC | Age: 54
End: 2024-09-26
Payer: COMMERCIAL

## 2024-09-26 PROCEDURE — RXMED WILLOW AMBULATORY MEDICATION CHARGE

## 2024-10-03 PROCEDURE — RXMED WILLOW AMBULATORY MEDICATION CHARGE

## 2024-10-04 ENCOUNTER — PHARMACY VISIT (OUTPATIENT)
Dept: PHARMACY | Facility: CLINIC | Age: 54
End: 2024-10-04
Payer: COMMERCIAL

## 2024-10-14 PROCEDURE — RXMED WILLOW AMBULATORY MEDICATION CHARGE

## 2024-10-15 ENCOUNTER — PHARMACY VISIT (OUTPATIENT)
Dept: PHARMACY | Facility: CLINIC | Age: 54
End: 2024-10-15
Payer: COMMERCIAL

## 2024-10-23 ENCOUNTER — PHARMACY VISIT (OUTPATIENT)
Dept: PHARMACY | Facility: CLINIC | Age: 54
End: 2024-10-23
Payer: COMMERCIAL

## 2024-10-23 PROCEDURE — RXMED WILLOW AMBULATORY MEDICATION CHARGE

## 2024-10-23 RX ORDER — PRAZOSIN HYDROCHLORIDE 1 MG/1
1 CAPSULE ORAL NIGHTLY
Qty: 30 CAPSULE | Refills: 1 | OUTPATIENT
Start: 2024-10-23

## 2024-10-29 PROCEDURE — RXMED WILLOW AMBULATORY MEDICATION CHARGE

## 2024-10-31 ENCOUNTER — PHARMACY VISIT (OUTPATIENT)
Dept: PHARMACY | Facility: CLINIC | Age: 54
End: 2024-10-31
Payer: COMMERCIAL

## 2024-10-31 PROCEDURE — RXMED WILLOW AMBULATORY MEDICATION CHARGE

## 2024-11-04 ENCOUNTER — PHARMACY VISIT (OUTPATIENT)
Dept: PHARMACY | Facility: CLINIC | Age: 54
End: 2024-11-04
Payer: COMMERCIAL

## 2024-11-04 PROCEDURE — RXMED WILLOW AMBULATORY MEDICATION CHARGE

## 2024-11-04 RX ORDER — LURASIDONE HYDROCHLORIDE 20 MG/1
20 TABLET, FILM COATED ORAL
Qty: 30 TABLET | Refills: 1 | OUTPATIENT
Start: 2024-11-04

## 2024-11-12 PROCEDURE — RXMED WILLOW AMBULATORY MEDICATION CHARGE

## 2024-11-13 ENCOUNTER — PHARMACY VISIT (OUTPATIENT)
Dept: PHARMACY | Facility: CLINIC | Age: 54
End: 2024-11-13
Payer: COMMERCIAL

## 2024-12-02 ENCOUNTER — PHARMACY VISIT (OUTPATIENT)
Dept: PHARMACY | Facility: CLINIC | Age: 54
End: 2024-12-02
Payer: COMMERCIAL

## 2024-12-02 PROCEDURE — RXMED WILLOW AMBULATORY MEDICATION CHARGE

## 2024-12-12 PROCEDURE — RXMED WILLOW AMBULATORY MEDICATION CHARGE

## 2024-12-13 ENCOUNTER — PHARMACY VISIT (OUTPATIENT)
Dept: PHARMACY | Facility: CLINIC | Age: 54
End: 2024-12-13
Payer: COMMERCIAL

## 2024-12-13 PROCEDURE — RXOTC WILLOW AMBULATORY OTC CHARGE

## 2024-12-22 ENCOUNTER — OFFICE VISIT (OUTPATIENT)
Dept: URGENT CARE | Age: 54
End: 2024-12-22
Payer: COMMERCIAL

## 2024-12-22 VITALS
TEMPERATURE: 98.4 F | OXYGEN SATURATION: 97 % | RESPIRATION RATE: 16 BRPM | HEART RATE: 95 BPM | DIASTOLIC BLOOD PRESSURE: 77 MMHG | SYSTOLIC BLOOD PRESSURE: 112 MMHG | WEIGHT: 195 LBS | BODY MASS INDEX: 31.47 KG/M2

## 2024-12-22 DIAGNOSIS — R21 RASH: Primary | ICD-10-CM

## 2024-12-22 DIAGNOSIS — L50.9 URTICARIA: ICD-10-CM

## 2024-12-22 PROCEDURE — 99203 OFFICE O/P NEW LOW 30 MIN: CPT | Performed by: REGISTERED NURSE

## 2024-12-22 PROCEDURE — 96372 THER/PROPH/DIAG INJ SC/IM: CPT | Performed by: REGISTERED NURSE

## 2024-12-22 PROCEDURE — 1036F TOBACCO NON-USER: CPT | Performed by: REGISTERED NURSE

## 2024-12-22 RX ORDER — TRIAMCINOLONE ACETONIDE 1 MG/G
OINTMENT TOPICAL 2 TIMES DAILY
Qty: 15 G | Refills: 0 | Status: SHIPPED | OUTPATIENT
Start: 2024-12-22 | End: 2024-12-22

## 2024-12-22 RX ORDER — METHYLPREDNISOLONE SODIUM SUCCINATE 125 MG/2ML
125 INJECTION INTRAMUSCULAR; INTRAVENOUS ONCE
Status: COMPLETED | OUTPATIENT
Start: 2024-12-22 | End: 2024-12-22

## 2024-12-22 RX ORDER — TRIAMCINOLONE ACETONIDE 1 MG/G
OINTMENT TOPICAL 2 TIMES DAILY
Qty: 15 G | Refills: 0 | Status: SHIPPED | OUTPATIENT
Start: 2024-12-22

## 2024-12-22 RX ADMIN — METHYLPREDNISOLONE SODIUM SUCCINATE 125 MG: 125 INJECTION INTRAMUSCULAR; INTRAVENOUS at 15:06

## 2024-12-22 NOTE — PROGRESS NOTES
Subjective   Patient ID: Clarisa Barboza is a 54 y.o. female. They present today with a chief complaint of Rash (Arms for 1 week).    History of Present Illness    History provided by:  Patient  Rash  This is a new problem. The current episode started in the past 7 days. The problem has been gradually worsening since onset. The rash is diffuse. The rash is characterized by itchiness, dryness and redness. She was exposed to nothing. Past treatments include nothing.       Past Medical History  Allergies as of 12/22/2024 - Reviewed 12/22/2024   Allergen Reaction Noted    Sulfa (sulfonamide antibiotics) Hives 10/24/2023       (Not in a hospital admission)       Past Medical History:   Diagnosis Date    Cataract 2022    Personal history of other diseases of the nervous system and sense organs     History of sleep apnea       Past Surgical History:   Procedure Laterality Date    CARPAL TUNNEL RELEASE  06/10/2013    Neuroplasty Decompression Median Nerve At Carpal Tunnel    HAND SURGERY  06/10/2013    Hand Surgery                                                                                                                                                          MOUTH SURGERY  06/10/2013    Oral Surgery Tooth Extraction    TONSILLECTOMY  06/10/2013    Tonsillectomy    WISDOM TOOTH EXTRACTION  01/01/1989        reports that she has never smoked. She has never used smokeless tobacco. She reports that she does not drink alcohol and does not use drugs.    Review of Systems  Review of Systems   Skin:  Positive for rash.        To multiple areas including bilateral arms, back and side                                  Objective    Vitals:    12/22/24 1423   BP: 112/77   BP Location: Left arm   Patient Position: Sitting   BP Cuff Size: Large adult   Pulse: 95   Resp: 16   Temp: 36.9 °C (98.4 °F)   TempSrc: Oral   SpO2: 97%   Weight: 88.5 kg (195 lb)     No LMP recorded (lmp unknown). (Menstrual status: Menopausal).    Physical  Exam  Skin:     Comments: Red rash to bilateral arms, abd and back          Procedures    Point of Care Test & Imaging Results from this visit  No results found for this visit on 12/22/24.   No results found.    Diagnostic study results (if any) were reviewed by Crystal L Severino, APRN-CNP.    Assessment/Plan   Allergies, medications, history, and pertinent labs/EKGs/Imaging reviewed by Crystal L Severino, APRN-CNP.     Medical Decision Making  VSS.  Skin assessment as above, solumedrol given.  Pulmonary assessment as noted above, pt is showing no signs of acute respiratory distress, speaking in full sentences with a pulse ox of 56% on RA.  At time of discharge patient was clinically well-appearing and HDS for outpatient management. The patient and/or family was educated regarding diagnosis, supportive care, OTC and Rx medications. The patient and/or family was given the opportunity to ask questions prior to discharge.  They verbalized understanding of my discussion of the plans for treatment, expected course, indications to return to  or seek further evaluation in ED, and the need for timely follow up as directed.   They were provided with a work/school excuse if requested.      Orders and Diagnoses  Diagnoses and all orders for this visit:  Rash  -     methylPREDNISolone sodium succinate (PF) (SOLU-Medrol) injection 125 mg  -     triamcinolone (Kenalog) 0.1 % ointment; Apply topically 2 times a day.  Urticaria  -     methylPREDNISolone sodium succinate (PF) (SOLU-Medrol) injection 125 mg  Use cream as directed and then use for 2 more days after rash is gone  Take luke warm bath/shower        Medical Admin Record  Administrations This Visit       methylPREDNISolone sodium succinate (PF) (SOLU-Medrol) injection 125 mg       Admin Date  12/22/2024 Action  Given Dose  125 mg Route  intravenous Documented By  Jennifer Nichols MA                    Patient disposition: Home    Electronically signed by Rosa  L Severino, APRN-CNP  3:17 PM

## 2024-12-22 NOTE — PATIENT INSTRUCTIONS
Use cream as directed and then use for 2 more days after rash is gone  Take luke warm bath/shower  If symptoms persist or worsen, follow up with your pcp

## 2025-01-02 ENCOUNTER — APPOINTMENT (OUTPATIENT)
Dept: PRIMARY CARE | Facility: CLINIC | Age: 55
End: 2025-01-02
Payer: COMMERCIAL

## 2025-01-03 ENCOUNTER — PHARMACY VISIT (OUTPATIENT)
Dept: PHARMACY | Facility: CLINIC | Age: 55
End: 2025-01-03
Payer: COMMERCIAL

## 2025-01-03 PROCEDURE — RXMED WILLOW AMBULATORY MEDICATION CHARGE

## 2025-01-03 RX ORDER — LURASIDONE HYDROCHLORIDE 20 MG/1
20 TABLET, FILM COATED ORAL
Qty: 30 TABLET | Refills: 1 | OUTPATIENT
Start: 2025-01-03

## 2025-01-07 ENCOUNTER — APPOINTMENT (OUTPATIENT)
Dept: DERMATOLOGY | Facility: CLINIC | Age: 55
End: 2025-01-07
Payer: COMMERCIAL

## 2025-01-10 PROCEDURE — RXMED WILLOW AMBULATORY MEDICATION CHARGE

## 2025-01-11 ENCOUNTER — PHARMACY VISIT (OUTPATIENT)
Dept: PHARMACY | Facility: CLINIC | Age: 55
End: 2025-01-11
Payer: COMMERCIAL

## 2025-01-17 ENCOUNTER — PHARMACY VISIT (OUTPATIENT)
Dept: PHARMACY | Facility: CLINIC | Age: 55
End: 2025-01-17
Payer: COMMERCIAL

## 2025-01-17 PROCEDURE — RXMED WILLOW AMBULATORY MEDICATION CHARGE

## 2025-01-17 RX ORDER — BUPROPION HYDROCHLORIDE 300 MG/1
300 TABLET ORAL
Qty: 90 TABLET | Refills: 0 | OUTPATIENT
Start: 2025-01-17

## 2025-01-17 RX ORDER — ALPRAZOLAM 0.25 MG/1
0.25 TABLET ORAL NIGHTLY PRN
Qty: 30 TABLET | Refills: 1 | OUTPATIENT
Start: 2025-01-17

## 2025-01-17 RX ORDER — DULOXETIN HYDROCHLORIDE 60 MG/1
60 CAPSULE, DELAYED RELEASE ORAL 2 TIMES DAILY
Qty: 180 CAPSULE | Refills: 1 | OUTPATIENT
Start: 2025-01-17

## 2025-01-17 RX ORDER — OLANZAPINE 2.5 MG/1
1.25 TABLET ORAL 3 TIMES DAILY
Qty: 135 TABLET | Refills: 1 | OUTPATIENT
Start: 2025-01-17

## 2025-01-17 RX ORDER — LAMOTRIGINE 200 MG/1
TABLET ORAL DAILY
Qty: 180 TABLET | Refills: 0 | OUTPATIENT
Start: 2025-01-17

## 2025-01-17 RX ORDER — BUPROPION HYDROCHLORIDE 150 MG/1
150 TABLET ORAL
Qty: 90 TABLET | Refills: 1 | OUTPATIENT
Start: 2025-01-17

## 2025-01-17 RX ORDER — TOPIRAMATE 25 MG/1
TABLET ORAL
Qty: 270 TABLET | Refills: 1 | OUTPATIENT
Start: 2025-01-17

## 2025-01-17 RX ORDER — METHYLPHENIDATE HYDROCHLORIDE 36 MG/1
TABLET ORAL
Qty: 60 TABLET | Refills: 0 | OUTPATIENT
Start: 2025-01-17

## 2025-01-17 RX ORDER — LURASIDONE HYDROCHLORIDE 20 MG/1
20 TABLET, FILM COATED ORAL
Qty: 90 TABLET | Refills: 1 | OUTPATIENT
Start: 2025-01-17

## 2025-02-03 ENCOUNTER — PHARMACY VISIT (OUTPATIENT)
Dept: PHARMACY | Facility: CLINIC | Age: 55
End: 2025-02-03
Payer: COMMERCIAL

## 2025-02-03 PROCEDURE — RXMED WILLOW AMBULATORY MEDICATION CHARGE

## 2025-02-11 PROCEDURE — RXMED WILLOW AMBULATORY MEDICATION CHARGE

## 2025-02-12 ENCOUNTER — PHARMACY VISIT (OUTPATIENT)
Dept: PHARMACY | Facility: CLINIC | Age: 55
End: 2025-02-12
Payer: COMMERCIAL

## 2025-02-12 PROCEDURE — RXOTC WILLOW AMBULATORY OTC CHARGE

## 2025-02-14 PROCEDURE — RXMED WILLOW AMBULATORY MEDICATION CHARGE

## 2025-02-17 ENCOUNTER — PHARMACY VISIT (OUTPATIENT)
Dept: PHARMACY | Facility: CLINIC | Age: 55
End: 2025-02-17
Payer: COMMERCIAL

## 2025-02-17 PROCEDURE — RXMED WILLOW AMBULATORY MEDICATION CHARGE

## 2025-02-17 RX ORDER — TRIAMCINOLONE ACETONIDE 1 MG/G
CREAM TOPICAL
Qty: 454 G | Refills: 1 | OUTPATIENT
Start: 2025-02-17

## 2025-02-17 RX ORDER — EMOLLIENT BASE
CREAM (GRAM) TOPICAL
Qty: 453 G | Refills: 5 | OUTPATIENT
Start: 2025-02-17

## 2025-02-17 RX ORDER — METHYLPREDNISOLONE 4 MG/1
TABLET ORAL
Qty: 21 EACH | Refills: 0 | OUTPATIENT
Start: 2025-02-17

## 2025-03-03 PROCEDURE — RXMED WILLOW AMBULATORY MEDICATION CHARGE

## 2025-03-06 ENCOUNTER — PHARMACY VISIT (OUTPATIENT)
Dept: PHARMACY | Facility: CLINIC | Age: 55
End: 2025-03-06
Payer: COMMERCIAL

## 2025-03-13 PROCEDURE — RXMED WILLOW AMBULATORY MEDICATION CHARGE

## 2025-03-14 ENCOUNTER — PHARMACY VISIT (OUTPATIENT)
Dept: PHARMACY | Facility: CLINIC | Age: 55
End: 2025-03-14
Payer: COMMERCIAL

## 2025-03-14 PROCEDURE — RXOTC WILLOW AMBULATORY OTC CHARGE

## 2025-03-17 ENCOUNTER — PHARMACY VISIT (OUTPATIENT)
Dept: PHARMACY | Facility: CLINIC | Age: 55
End: 2025-03-17
Payer: COMMERCIAL

## 2025-03-17 PROCEDURE — RXMED WILLOW AMBULATORY MEDICATION CHARGE

## 2025-03-17 RX ORDER — PERMETHRIN 50 MG/G
CREAM TOPICAL
Qty: 240 G | Refills: 1 | OUTPATIENT
Start: 2025-03-17

## 2025-03-31 PROCEDURE — RXMED WILLOW AMBULATORY MEDICATION CHARGE

## 2025-04-01 ENCOUNTER — PHARMACY VISIT (OUTPATIENT)
Dept: PHARMACY | Facility: CLINIC | Age: 55
End: 2025-04-01
Payer: COMMERCIAL

## 2025-04-01 PROCEDURE — RXMED WILLOW AMBULATORY MEDICATION CHARGE

## 2025-04-10 ENCOUNTER — HOSPITAL ENCOUNTER (OUTPATIENT)
Dept: RADIOLOGY | Facility: HOSPITAL | Age: 55
Discharge: HOME | End: 2025-04-10
Payer: COMMERCIAL

## 2025-04-10 ENCOUNTER — OFFICE VISIT (OUTPATIENT)
Dept: ORTHOPEDIC SURGERY | Facility: CLINIC | Age: 55
End: 2025-04-10
Payer: COMMERCIAL

## 2025-04-10 VITALS — HEIGHT: 60 IN | BODY MASS INDEX: 36.52 KG/M2 | WEIGHT: 186 LBS

## 2025-04-10 DIAGNOSIS — M75.21 BICEPS TENDINITIS ON RIGHT: Primary | ICD-10-CM

## 2025-04-10 DIAGNOSIS — M25.521 RIGHT ELBOW PAIN: ICD-10-CM

## 2025-04-10 DIAGNOSIS — M25.511 RIGHT SHOULDER PAIN, UNSPECIFIED CHRONICITY: ICD-10-CM

## 2025-04-10 PROCEDURE — 3008F BODY MASS INDEX DOCD: CPT

## 2025-04-10 PROCEDURE — 1036F TOBACCO NON-USER: CPT

## 2025-04-10 PROCEDURE — 73080 X-RAY EXAM OF ELBOW: CPT | Mod: RT

## 2025-04-10 PROCEDURE — 73030 X-RAY EXAM OF SHOULDER: CPT | Mod: RT

## 2025-04-10 PROCEDURE — 99203 OFFICE O/P NEW LOW 30 MIN: CPT

## 2025-04-10 ASSESSMENT — PAIN - FUNCTIONAL ASSESSMENT: PAIN_FUNCTIONAL_ASSESSMENT: 0-10

## 2025-04-10 ASSESSMENT — PAIN SCALES - GENERAL: PAINLEVEL_OUTOF10: 2

## 2025-04-10 NOTE — PROGRESS NOTES
History of Present Illness:    54 y.o. female presents to the clinic for right biceps pain. She states tried the bicep curl machine for the first time yesterday at the gym.  She states she went to do her first rep and felt a pop in her elbow. She notes more of her pain is the mid biceps today. She states if feels like a strain. She denies any bruising or swelling.  Patient is currently taking ibuprofen for pain. She denies any pain in her shoulder. She has been resting and it does help her pain. Patient denies any numbness or tingling. No neck pain. No surgeries on her right shoulder or elbow previously.        Review of Systems:    GENERAL: Negative  GI: Negative  MUSCULOSKELETAL: See HPI  SKIN: Negative  NEURO:  Negative     Physical Exam:    Shoulder:  Examination of right shoulder demonstrates no tenderness to palpation to the sternoclavicular joint. No tenderness to the AC Joint. Range of motion is 130 degrees of flexion abduction, 70 degrees of external rotation, and L5 of internal rotation. Positive Biceps , Negaitve Neers, Ingram. Negative jobes.  No marci deformity noted. Examination of the right elbow demonstrates no swelling or bruising. No deformity noted to the biceps today. Patient has tenderness to palpation over the anterior cubital fossa.  No tenderness with palpation of the medial or lateral epicondyle. No tenderness over olecranon or triceps. Good strength with triceps. Mild Pain with supination and pronation. Mild Pain with extension., but full motion of the elbow today. Negative Kinards. No defected noted.  Negative ulnar nerve compression testing. wrist motion were not irritable.  Radial pulse 2+ and palpable. SILT. UE is NVI.     Imaging:  X-rays of the patient were ordered by India Granados PA-C and obtained today.  India Granados PA-C personally reviewed the results of the x-rays.    In addition, India Granados PA-C independently interpreted the patient's x-rays (performed by the Radiology  department) by viewing the x-ray images and this is India Granados PA-C's personal interpretation:       X-rays of the right shoulder demonstrate well maintained glenohumeral joint space. No AC joint arthritis noted. No acute fracture or dislocation noted.  X-rays of the right elbow demonstrate well maintained joint space. No acute fracture or dislocation noted.     Assessment:   Right elbow pain related to distal biceps tendinitis vs partial tear. No evidence of complete rupture today.     Plan:  We discussed further management with the patient today. At this time we discussed with her that she does not appear to have a distal biceps rupture. We discussed that she could have tendinitis vs partial tearing. We discussed various treatment options with the patient today. Patient has elected to move forward with conservative treatments. She wants to give it more time to see if it gets better on its own with rest and activity modification. She can slowly increase activity as tolerable as it feels better.  She can take tylenol and nsaids as needed for pain. If it continues to be bothersome or feels like it getting worse then she should follow up and we will plan on moving forward with an MRI of the elbow to evaluate for partial distal biceps tearing. We will see him back as needed.

## 2025-04-10 NOTE — PATIENT INSTRUCTIONS
BMI was above normal measurement. Current weight: 84.4 kg (186 lb)  Weight change since last visit (-) denotes wt loss -9 lbs   Weight loss needed to achieve BMI 25: 58.3 Lbs  Weight loss needed to achieve BMI 30: 32.7 Lbs  Advised to Increase physical activity.

## 2025-04-11 PROCEDURE — RXMED WILLOW AMBULATORY MEDICATION CHARGE

## 2025-04-14 ENCOUNTER — PHARMACY VISIT (OUTPATIENT)
Dept: PHARMACY | Facility: CLINIC | Age: 55
End: 2025-04-14
Payer: COMMERCIAL

## 2025-04-16 ENCOUNTER — PHARMACY VISIT (OUTPATIENT)
Dept: PHARMACY | Facility: CLINIC | Age: 55
End: 2025-04-16
Payer: COMMERCIAL

## 2025-04-16 PROCEDURE — RXOTC WILLOW AMBULATORY OTC CHARGE

## 2025-04-16 PROCEDURE — RXMED WILLOW AMBULATORY MEDICATION CHARGE

## 2025-05-05 PROCEDURE — RXMED WILLOW AMBULATORY MEDICATION CHARGE

## 2025-05-06 ENCOUNTER — PHARMACY VISIT (OUTPATIENT)
Dept: PHARMACY | Facility: CLINIC | Age: 55
End: 2025-05-06
Payer: COMMERCIAL

## 2025-05-13 PROCEDURE — RXMED WILLOW AMBULATORY MEDICATION CHARGE

## 2025-05-14 ENCOUNTER — PHARMACY VISIT (OUTPATIENT)
Dept: PHARMACY | Facility: CLINIC | Age: 55
End: 2025-05-14
Payer: COMMERCIAL

## 2025-05-14 PROCEDURE — RXOTC WILLOW AMBULATORY OTC CHARGE

## 2025-05-14 PROCEDURE — RXMED WILLOW AMBULATORY MEDICATION CHARGE

## 2025-05-27 ENCOUNTER — APPOINTMENT (OUTPATIENT)
Dept: RADIOLOGY | Facility: HOSPITAL | Age: 55
End: 2025-05-27
Payer: COMMERCIAL

## 2025-05-27 DIAGNOSIS — S46.211A STRAIN OF MUSCLE, FASCIA AND TENDON OF OTHER PARTS OF BICEPS, RIGHT ARM, INITIAL ENCOUNTER: ICD-10-CM

## 2025-05-27 PROCEDURE — 73221 MRI JOINT UPR EXTREM W/O DYE: CPT | Mod: RIGHT SIDE | Performed by: RADIOLOGY

## 2025-05-27 PROCEDURE — 73221 MRI JOINT UPR EXTREM W/O DYE: CPT | Mod: RT

## 2025-05-30 ENCOUNTER — APPOINTMENT (OUTPATIENT)
Dept: RADIOLOGY | Facility: HOSPITAL | Age: 55
End: 2025-05-30
Payer: COMMERCIAL

## 2025-06-01 NOTE — PROGRESS NOTES
Subjective   Patient ID: Clarisa Barboza is a 54 y.o. female    Chief Complaint: No chief complaint on file.       Last Surgery: No surgery found  Date of Last Surgery: No surgery found    HPI  Clarisa Barboza is a 54 y.o. *** hand dominant female presenting for right shoulder and elbow pain       Objective   Patient is a well-developed, well-nourished female  in no acute distress.  Breathes with normal chest rises.  Pupils are round and symmetric today.  Awake, alert, and oriented x3.      Examination of the left shoulder today reveals the skin to be intact. There is no sign of any atrophy, lesions, or abrasions. There is no pain to palpation of the bony prominences. Cervical lymphadenopathy examined, and this was negative. Patient had 5 out of 5 wrist flexion, extension, and thumb extension bilaterally. Sensation was intact to light touch to median, ulnar, radial axillary, and musculocutaneous nerves bilaterally. Positive radial pulse bilaterally. ***Provocative maneuvers are negative today. Range of motion of the left shoulder revealed 0-170° of forward elevation, 0-60° of external rotation, and internal rotation was to T-12.     Examination of the right shoulder today reveals the skin to be intact. There is no sign of any atrophy, lesions, or abrasions. There is no pain to palpation of the bony prominences. Cervical lymphadenopathy examined, and this was negative. Patient had 5 out of 5 wrist flexion, extension, and thumb extension, bilaterally. Sensation was intact to light touch to median, ulnar, radial, axillary, and musculocutaneous nerves bilaterally. Positive radial pulse bilaterally. ***Provocative maneuvers are negative today. Range of motion of the right shoulder revealed 0-170° of forward elevation, 0-60° of external rotation, and internal rotation up to T-12.    Examination of the right elbow today reveals the skin to be intact. No evidence of ecchymosis, bruising, lesions, or scarring. The epitrochlear  lymph node exam at the time of visit was negative bilaterally. 5 out of 5 wrist flexion, wrist extension, and thumb extension bilaterally. Sensation is intact to light touch to median, ulnar, and radial nerves bilaterally. Stable to varus and valgus stress bilaterally.  Range of motion of the right elbow revealed 0-150° of flexion and extension and 85 degrees of supination and pronation. Patient had no tenderness to palpation at their medial or lateral epicondyle.        Imaging:    Assessment/Plan   No diagnosis found.  Patient with ***    No orders of the defined types were placed in this encounter.        Follow up ***    Scribe Attestation  By signing my name below, INathalia Scribe   attest that this documentation has been prepared under the direction and in the presence of Ham Carreno MD.    understands this will likely be taken care of by one of my colleagues but we will make this determination in the future.     No orders of the defined types were placed in this encounter.        Follow up as needed    Scribe Attestation  By signing my name below, I, Nathalia Martinez , Scribe   attest that this documentation has been prepared under the direction and in the presence of Ham Carreno MD.

## 2025-06-02 ENCOUNTER — APPOINTMENT (OUTPATIENT)
Dept: ORTHOPEDIC SURGERY | Facility: CLINIC | Age: 55
End: 2025-06-02
Payer: COMMERCIAL

## 2025-06-02 DIAGNOSIS — M75.21 BICEPS TENDINITIS ON RIGHT: Primary | ICD-10-CM

## 2025-06-02 PROCEDURE — 99214 OFFICE O/P EST MOD 30 MIN: CPT | Performed by: ORTHOPAEDIC SURGERY

## 2025-06-03 PROCEDURE — RXMED WILLOW AMBULATORY MEDICATION CHARGE

## 2025-06-05 ENCOUNTER — PHARMACY VISIT (OUTPATIENT)
Dept: PHARMACY | Facility: CLINIC | Age: 55
End: 2025-06-05
Payer: COMMERCIAL

## 2025-06-05 ENCOUNTER — PREP FOR PROCEDURE (OUTPATIENT)
Dept: ORTHOPEDIC SURGERY | Facility: HOSPITAL | Age: 55
End: 2025-06-05
Payer: COMMERCIAL

## 2025-06-05 DIAGNOSIS — S46.211A RUPTURE OF RIGHT DISTAL BICEPS TENDON, INITIAL ENCOUNTER: Primary | ICD-10-CM

## 2025-06-05 RX ORDER — CEFAZOLIN SODIUM 2 G/100ML
2 INJECTION, SOLUTION INTRAVENOUS ONCE
OUTPATIENT
Start: 2025-06-05 | End: 2025-06-05

## 2025-06-11 ENCOUNTER — OFFICE VISIT (OUTPATIENT)
Dept: ORTHOPEDIC SURGERY | Facility: CLINIC | Age: 55
End: 2025-06-11
Payer: COMMERCIAL

## 2025-06-11 DIAGNOSIS — S46.211A RUPTURE OF RIGHT DISTAL BICEPS TENDON, INITIAL ENCOUNTER: Primary | ICD-10-CM

## 2025-06-11 PROCEDURE — 99202 OFFICE O/P NEW SF 15 MIN: CPT | Performed by: STUDENT IN AN ORGANIZED HEALTH CARE EDUCATION/TRAINING PROGRAM

## 2025-06-11 PROCEDURE — 99214 OFFICE O/P EST MOD 30 MIN: CPT | Performed by: STUDENT IN AN ORGANIZED HEALTH CARE EDUCATION/TRAINING PROGRAM

## 2025-06-11 ASSESSMENT — PAIN SCALES - GENERAL: PAINLEVEL_OUTOF10: 3

## 2025-06-11 ASSESSMENT — PAIN - FUNCTIONAL ASSESSMENT: PAIN_FUNCTIONAL_ASSESSMENT: 0-10

## 2025-06-11 NOTE — PROGRESS NOTES
CHIEF COMPLAINT: No chief complaint on file.    History: 54 y.o. female presents to the office today for evaluation of her right elbow.  The patient is right-hand dominant.  In April, she had an injury and felt sharp pain in the right elbow.  She was seen by my colleague and an MRI was ordered that showed distal biceps rupture.  Here today for a preoperative visit.  Been having pain in the anterior elbow since this time.    Past medical history, past surgical history, medications, allergies, family history, social history, and review of systems were reviewed today.    A 12 point review of systems was negative other than as stated in the HPI.    Medical History[1]     Allergies[2]     Surgical History[3]     Family History[4]     Social History     Socioeconomic History    Marital status:      Spouse name: Not on file    Number of children: Not on file    Years of education: Not on file    Highest education level: Not on file   Occupational History    Not on file   Tobacco Use    Smoking status: Never    Smokeless tobacco: Never   Substance and Sexual Activity    Alcohol use: Never    Drug use: Never    Sexual activity: Not on file   Other Topics Concern    Not on file   Social History Narrative    Not on file     Social Drivers of Health     Financial Resource Strain: Not at Risk (7/24/2024)    Received from [employer]    Financial Resource Strain     Financial Resource Strain: 1   Food Insecurity: Not on File (9/26/2024)    Received from [facility]    Food Insecurity     Food: 0   Transportation Needs: Not at Risk (7/24/2024)    Received from [facility]    Transportation Needs     Transportation: 1   Physical Activity: Not on File (5/21/2021)    Received from [facility]    Physical Activity     Physical Activity: 0   Stress: Not at Risk (7/24/2024)    Received from [facility]    Stress     Stress: 1   Social Connections: Not at Risk (7/24/2024)    Received from [facility]    Social Connections     Connectedness: 1   Intimate Partner  "Violence: Not on file   Housing Stability: Not at Risk (2024)    Received from [employer]    Housing Stability     Housin        CURRENT MEDICATIONS:   Current Medications[5]    Physical Examination:      10/18/2023     6:04 AM 10/24/2023     8:03 AM 2023    10:21 AM 2023     3:04 PM 2024     2:23 PM 4/10/2025    10:26 AM 2025     3:39 PM   Vitals   Systolic  130 136 112 112     Diastolic  70 80 74 77     BP Location  Right arm   Left arm     Heart Rate 75 87 68 94 95     Temp  36.3 °C (97.3 °F)   36.9 °C (98.4 °F)     Resp 18    16     Height 1.548 m (5' 0.95\")  1.524 m (5') 1.676 m (5' 6\")  1.524 m (5') 1.524 m (5')   Weight (lb) 189.82 193 189 190 195 186 186   BMI 35.93 kg/m2 36.53 kg/m2 36.91 kg/m2 30.67 kg/m2 31.47 kg/m2 36.33 kg/m2 36.33 kg/m2   BSA (m2) 1.92 m2 1.94 m2 1.9 m2 2 m2 2.03 m2 1.89 m2 1.89 m2   Visit Report  Report Report    Report Report Report Report       There is no height or weight on file to calculate BMI.    Well-appearing, appears stated age, pleasant and cooperative, appropriate mood and behavior. Height and weight reviewed. Alert and oriented x3.  Auditory function intact.  No acute distress.  Intact ocular function, LUIZ, EOMI. Breathing is unlabored .  There is no evidence of jugular venous distension. Skin appearance is normal without evidence of rash or other lesions. 2+ radial pulses bilaterally, fingers pink and wwp, good capillary refill, no pitting edema. No appreciable lymphadenopathy in bilateral upper extremities. SILT throughout both upper extremities, median/radial/ulnar/musculocutaneous/axillary nerve motor and sensory intact (except for abnormalities noted in focused musculoskeletal exam section below).     Neck exam: Full range of motion of the neck in flexion/extension and rotational movements. No significant areas of tenderness to palpation in the neck.    On exam of bilateral upper extremities, still has full range of motion the elbow in " flexion extension and supination pronation but has pain.  Pain along the right biceps tendon.    Imaging: MRI of the right elbow was reviewed today.  Personally interpreted by myself.  There is a high-grade tear of the right distal biceps tendon.  Essentially 90% of the tendon is torn.    Assessment: Right distal biceps tear    Plan: The patient has a distal biceps full-thickness tear after an acute injury.  Discussed that with nonoperative management, the patient will likely lose some portion of elbow flexion strength, but more significantly, will lose around 20 to 30% of supination strength, especially with repetitive higher endurance activities.  In younger, or active individuals, the presence of a full thickness distal biceps tear, especially in patients who want to be active, is an indication for repair. I did discuss that getting to these injuries as soon as possible increases the chances of primary repair, and in situations where the surgery has to be delayed for more than 4 weeks, that there is a higher chance of need for graft, and this could make outcomes more unpredictable.  Patient understood this and wished to proceed.  Risks of surgery include bleeding, infection, damage neurovascular structures, need for further surgery, failure of the repair, persistent pain, recurrent tear, numbness in the distribution of the lateral antebrachial cutaneous nerve, heterotopic bone formation, need for further surgery.  I did discuss that because utilizing the 1 incision technique, many patients may have some numbness around the incision due to the proximity of the lateral antebrachial cutaneous nerve, and over time this will improve.  There are also risks of anesthesia which include stroke, heart attack and even death.  After surgery, we will keep the patient immobilized for 2 weeks and then get him into a hinged elbow brace and begin range of motion.  Physical therapy and gentle strengthening will begin at the 6-week  postoperative jose.  Did discuss that we will take anywhere from 3 to 6 months and get back to all activities.     The patient will need PATs which will also include a CBC, BMP, PT/INR and a full work up by the PAT team as well as anesthesia evaluation.  My office will work to get this scheduled. I will see them back 2 weeks after surgery.      Dragon software was used to dictate this note, please be aware that minor errors in transcription may be present.    Renny Mendez MD    Shoulder/Elbow Surgery  [facility]            [1]   Past Medical History:  Diagnosis Date    Cataract 2022    Personal history of other diseases of the nervous system and sense organs     History of sleep apnea   [2]   Allergies  Allergen Reactions    Sulfa (Sulfonamide Antibiotics) Hives   [3]   Past Surgical History:  Procedure Laterality Date    CARPAL TUNNEL RELEASE  06/10/2013    Neuroplasty Decompression Median Nerve At Carpal Tunnel    HAND SURGERY  06/10/2013    Hand Surgery                                                                                                                                                          MOUTH SURGERY  06/10/2013    Oral Surgery Tooth Extraction    TONSILLECTOMY  06/10/2013    Tonsillectomy    WISDOM TOOTH EXTRACTION  01/01/1989   [4]   Family History  Problem Relation Name Age of Onset    Arthritis Mother Mom     Depression Mother Mom     Hearing loss Mother Mom     Hyperlipidemia Mother Mom     Alcohol abuse Father Dad     Depression Father Dad     Hearing loss Father Dad     Heart disease Father Dad         CABG    Aortic aneurysm Father Dad         ABDOMINAL    Peripheral vascular disease Father Dad     Transient ischemic attack Father Dad     Cancer Mother's Sister Aunt Gladys     Depression Maternal Grandmother Cayla     Cancer Paternal Grandmother Louisa     Hearing loss Paternal Grandfather Grandpa     Heart disease Paternal  Grandfather Spring    [5]   Current Outpatient Medications   Medication Sig Dispense Refill    ALPRAZolam (Xanax) 0.25 mg tablet Take 1 tablet (0.25 mg) by mouth 1 time per day as needed for anxiety 30 tablet 1    ALPRAZolam (Xanax) 0.25 mg tablet Take 1 tablet by mouth nightly at bedtime as needed for sleep 30 tablet 1    buPROPion XL (Wellbutrin XL) 150 mg 24 hr tablet TAKE 1 TABLET BY MOUTH EVERY DAY WITH  MG TABLET 30 tablet 2    buPROPion XL (Wellbutrin XL) 150 mg 24 hr tablet Take 1 tablet by mouth every morning 90 tablet 1    buPROPion XL (Wellbutrin XL) 150 mg 24 hr tablet Take 1 tablet (150 mg) by mouth once daily in the morning. Take before meals. 90 tablet 1    buPROPion XL (Wellbutrin XL) 150 mg 24 hr tablet Take 1 Tablet by mouth every morning 90 tablet 1    buPROPion XL (Wellbutrin XL) 300 mg 24 hr tablet TAKE 1 TABLET BY MOUTH EVERY DAY WITH THE 150MG TABLET 30 tablet 2    buPROPion XL (Wellbutrin XL) 300 mg 24 hr tablet Take 1 tablet by mouth every morning 90 tablet 0    buPROPion XL (Wellbutrin XL) 300 mg 24 hr tablet Take 1 tablet (300 mg) by mouth once daily in the morning. Take before meals. 90 tablet 0    buPROPion XL (Wellbutrin XL) 300 mg 24 hr tablet Take 1 tablet (300 mg) by mouth once daily in the morning. Take before meals. 90 tablet 0    clobetasol (Temovate) 0.05 % ointment Apply twice daily for 2 weeks 60 g 3    desoximetasone (Topicort) 0.05 % ointment Apply twice daily for 2 weeks 100 g 3    DULoxetine (Cymbalta) 60 mg DR capsule Take 1 capsule (60 mg) by mouth 2 times a day. 60 capsule 0    DULoxetine (Cymbalta) 60 mg DR capsule Take 1 capsule by mouth 2 (two) times daily 180 capsule 1    DULoxetine (Cymbalta) 60 mg DR capsule Take 1 capsule (60 mg) by mouth 2 times a day. 180 capsule 1    DULoxetine (Cymbalta) 60 mg DR capsule Take 1 capsule (60 mg) by mouth 2 times a day. 180 capsule 1    emollient (Vanicream) cream Apply a thin layer to affected area daily after bath 453 g  5    lamoTRIgine (LaMICtal) 200 mg tablet Take 1 tablet (200 mg) by mouth 2 times a day. 60 tablet 2    lamoTRIgine (LaMICtal) 200 mg tablet Take 2 Tablets by mouth once daily 180 tablet 0    lamoTRIgine (LaMICtal) 200 mg tablet Take 2 tablets (400 mg) by mouth once daily. 180 tablet 0    latanoprost (Xalatan) 0.005 % ophthalmic solution Administer 1 drop into both eyes once daily.      lurasidone (Latuda) 20 mg tablet Take 1 tablet (20 mg) by mouth once daily in the evening. Take with meals. 30 tablet 1    lurasidone (Latuda) 20 mg tablet Take 1 Tablet by mouth once daily with dinner 90 tablet 1    lurasidone (Latuda) 20 mg tablet Take 1 tablet (20 mg) by mouth once daily in the evening. Take with meals. 90 tablet 1    lurasidone (Latuda) 20 mg tablet Take 1 tablet (20 mg) by mouth once daily in the evening. Take with meals. 90 tablet 1    methylphenidate ER (Concerta) 36 mg daily tablet Take 2 tablets (72 mg) by mouth once daily in the morning. Take before meals. 60 tablet 0    [START ON 7/12/2025] methylphenidate ER 36 mg extended release tablet Take 1 tablet (36 mg) by mouth 2 times a day. Do not fill before July 12, 2025. 60 tablet 0    [START ON 6/12/2025] methylphenidate ER 36 mg extended release tablet Take 1 tablet (36 mg) by mouth 2 times a day. Do not fill before June 12, 2025. 60 tablet 0    methylphenidate ER 36 mg extended release tablet Take 1 tablet (36 mg) by mouth 2 times a day. 60 tablet 0    methylPREDNISolone (Medrol Dospak) 4 mg tablets Follow printed directions on package. 21 each 0    OLANZapine (ZyPREXA) 2.5 mg tablet Take 0.5 Tablets by mouth 3 (three) times daily 135 tablet 1    OLANZapine (ZyPREXA) 2.5 mg tablet Take 0.5 Tablets by mouth 3 (three) times daily 135 tablet 1    OLANZapine (ZyPREXA) 2.5 mg tablet Take 0.5 Tablets by mouth 3 (three) times daily 135 tablet 1    OLANZapine (ZyPREXA) 2.5 mg tablet Take 0.5 tablets (1.25 mg) by mouth 3 times a day. 135 tablet 1    OLANZapine  (ZyPREXA) 5 mg tablet Take 1 tablet (5 mg) by mouth 2 times a day. Morning and mid-day (Patient not taking: Reported on 12/22/2024) 60 tablet 2    permethrin (Elimite) 5 % cream Apply thin layer from neck to toes. Bathe to remove drug after 8-14 hrs. Repeat in 7 days if still present.. 240 g 1    topiramate (Topamax) 25 mg tablet Take 3 tablets (75 mg) by mouth once daily at bedtime. (Patient taking differently: Take 25 mg by mouth once daily.) 9 tablet 0    topiramate (Topamax) 50 mg tablet Take 1 tablet (50 mg) by mouth daily (with 25mg to equal 75mg) 30 tablet 0    topiramate (Topamax) 50 mg tablet Take 1 Tablet by mouth 2 (two) times daily 30 tablet 1    topiramate (Topamax) 50 mg tablet Take 1 tablet (50 mg) by mouth once daily at bedtime. 30 tablet 1    triamcinolone (Kenalog) 0.1 % cream Apply sparingly to affected area 2 times daily 454 g 1    triamcinolone (Kenalog) 0.1 % ointment Apply topically 2 times a day. 15 g 0    vit A/vit C/vit E/zinc/copper (ICAPS AREDS ORAL) Take 500 mg by mouth once daily.       No current facility-administered medications for this visit.

## 2025-06-12 PROCEDURE — RXMED WILLOW AMBULATORY MEDICATION CHARGE

## 2025-06-13 ENCOUNTER — PHARMACY VISIT (OUTPATIENT)
Dept: PHARMACY | Facility: CLINIC | Age: 55
End: 2025-06-13
Payer: COMMERCIAL

## 2025-06-17 ENCOUNTER — PRE-ADMISSION TESTING (OUTPATIENT)
Dept: PREADMISSION TESTING | Facility: HOSPITAL | Age: 55
End: 2025-06-17
Payer: COMMERCIAL

## 2025-06-17 VITALS
OXYGEN SATURATION: 99 % | RESPIRATION RATE: 16 BRPM | HEART RATE: 88 BPM | DIASTOLIC BLOOD PRESSURE: 68 MMHG | TEMPERATURE: 98.4 F | BODY MASS INDEX: 34.75 KG/M2 | SYSTOLIC BLOOD PRESSURE: 128 MMHG | WEIGHT: 177 LBS | HEIGHT: 60 IN

## 2025-06-17 DIAGNOSIS — Z01.818 PREOPERATIVE TESTING: Primary | ICD-10-CM

## 2025-06-17 DIAGNOSIS — S46.211A RUPTURE OF RIGHT DISTAL BICEPS TENDON, INITIAL ENCOUNTER: ICD-10-CM

## 2025-06-17 LAB
ANION GAP SERPL CALCULATED.3IONS-SCNC: 8 MMOL/L (ref 10–20)
BUN SERPL-MCNC: 28 MG/DL (ref 6–23)
CALCIUM SERPL-MCNC: 9.4 MG/DL (ref 8.6–10.3)
CHLORIDE SERPL-SCNC: 109 MMOL/L (ref 98–107)
CO2 SERPL-SCNC: 29 MMOL/L (ref 21–32)
CREAT SERPL-MCNC: 0.96 MG/DL (ref 0.5–1.05)
EGFRCR SERPLBLD CKD-EPI 2021: 69 ML/MIN/1.73M*2
ERYTHROCYTE [DISTWIDTH] IN BLOOD BY AUTOMATED COUNT: 12.7 % (ref 11.5–14.5)
GLUCOSE SERPL-MCNC: 71 MG/DL (ref 74–99)
HCT VFR BLD AUTO: 43 % (ref 36–46)
HGB BLD-MCNC: 14.6 G/DL (ref 12–16)
MCH RBC QN AUTO: 32.4 PG (ref 26–34)
MCHC RBC AUTO-ENTMCNC: 34 G/DL (ref 32–36)
MCV RBC AUTO: 96 FL (ref 80–100)
NRBC BLD-RTO: 0 /100 WBCS (ref 0–0)
PLATELET # BLD AUTO: 185 X10*3/UL (ref 150–450)
POTASSIUM SERPL-SCNC: 4.3 MMOL/L (ref 3.5–5.3)
RBC # BLD AUTO: 4.5 X10*6/UL (ref 4–5.2)
SODIUM SERPL-SCNC: 142 MMOL/L (ref 136–145)
WBC # BLD AUTO: 3.3 X10*3/UL (ref 4.4–11.3)

## 2025-06-17 PROCEDURE — 99204 OFFICE O/P NEW MOD 45 MIN: CPT | Performed by: NURSE PRACTITIONER

## 2025-06-17 PROCEDURE — 36415 COLL VENOUS BLD VENIPUNCTURE: CPT

## 2025-06-17 PROCEDURE — 87081 CULTURE SCREEN ONLY: CPT | Mod: TRILAB

## 2025-06-17 PROCEDURE — 80048 BASIC METABOLIC PNL TOTAL CA: CPT

## 2025-06-17 PROCEDURE — 85027 COMPLETE CBC AUTOMATED: CPT

## 2025-06-17 PROCEDURE — RXMED WILLOW AMBULATORY MEDICATION CHARGE

## 2025-06-17 RX ORDER — ACETAMINOPHEN 500 MG
1000 TABLET ORAL EVERY 8 HOURS PRN
COMMUNITY

## 2025-06-17 RX ORDER — CHLORHEXIDINE GLUCONATE ORAL RINSE 1.2 MG/ML
SOLUTION DENTAL
Qty: 473 ML | Refills: 0 | Status: SHIPPED | OUTPATIENT
Start: 2025-06-17 | End: 2025-06-19 | Stop reason: HOSPADM

## 2025-06-17 ASSESSMENT — ENCOUNTER SYMPTOMS
RESPIRATORY NEGATIVE: 1
ACTIVITY CHANGE: 1
NEUROLOGICAL NEGATIVE: 1
ARTHRALGIAS: 1
CARDIOVASCULAR NEGATIVE: 1
PSYCHIATRIC NEGATIVE: 1
EYES NEGATIVE: 1
GASTROINTESTINAL NEGATIVE: 1

## 2025-06-17 NOTE — PREPROCEDURE INSTRUCTIONS
Preoperative Fasting Guidelines    Why must I stop eating and drinking near surgery time?  With sedation, food or liquid in your stomach can enter your lungs causing serious complications  Increases nausea and vomiting    When do I need to stop eating and drinking before my surgery?  Do not eat any food after midnight the night before your surgery/procedure.  You may have up to 13.5 ounces of clear liquid until TWO hours before your instructed arrival time to the hospital.  This includes water, black tea/coffee, (no milk or cream) apple juice, and electrolyte drinks (Gatorade)  You may chew gum until TWO hours before your surgery/procedure    PAT DISCHARGE INSTRUCTIONS    The Same Day Surgery (SDS) Department of the hospital where your procedure will be performed will contact you after 2:00 PM the day before your surgery. If you are scheduled on a Monday, or a Tuesday following a Monday holiday, they will call on the last business day prior to your surgery.  Please check your voicemail for any missed messages.      [facility]  [address] [place] [address]  [phone]    Please let your surgeon know if:      You develop any open sores, shingles, burning or painful urination as these may increase your risk of an infection.   You no longer wish to have the surgery.   Any other personal circumstances change that may lead to the need to cancel or defer this surgery-such as being sick or getting admitted to any hospital within one week of your planned procedure.    Your contact details change, such as a change of address or phone number.    Starting now:     Please DO NOT drink alcohol or smoke for 24 hours before surgery. It is well known that quitting smoking can make a huge difference to your health and recovery from surgery. The longer you abstain from smoking, the better your chances of a healthy recovery. If you need help with quitting, call 2-795-QUIT-NOW to be  connected to a trained counselor who will discuss the best methods to help you quit.     Before your surgery:    Please stop all supplements 7 days prior to surgery. Or as directed by your surgeon.   Please stop taking NSAID pain medicine such as Advil and Motrin 7 days before surgery.    If you develop any fever, cough, cold, rashes, cuts, scratches, scrapes, urinary symptoms or infection anywhere on your body (including teeth and gums) prior to surgery, please call your surgeon’s office as soon as possible. This may require treatment to reduce the chance of cancellation on the day of surgery.    The day before your surgery:   DIET- Please follow the diet instructions at the top of your packet.   Get a good night’s rest.  Use the special soap for bathing if you have been instructed to use one.    Scheduled surgery times may change and you will be notified if this occurs - please check your personal voicemail for any updates.     On the morning of surgery:   Wear comfortable, loose fitting clothes which open in the front. Please do not wear moisturizers, creams, lotions, makeup or perfume.    Please bring with you to surgery:   Photo ID and insurance card   Current list of medicines and allergies   Pacemaker/ Defibrillator/Heart stent cards   CPAP machine and mask    Slings/ splints/ crutches   A copy of your complete advanced directive/DHPOA.    Please do NOT bring with you to surgery:   All jewelry and valuables should be left at home.   Prosthetic devices such as contact lenses, hearing aids, dentures, eyelash extensions, hairpins and body piercings must be removed prior to going in to the surgical suite.    After outpatient surgery:   A responsible adult MUST accompany you at the time of discharge and stay with you for 24 hours after your surgery. You may NOT drive yourself home after surgery.    Do not drive, operate machinery, make critical decisions or do activities that require co-ordination or balance until  after a night’s sleep.   Do not drink alcoholic beverages for 24 hours.   Instructions for resuming your medications will be provided by your surgeon.    CALL YOUR DOCTOR AFTER SURGERY IF YOU HAVE:     Chills and/or a fever of 101° F or higher.    Redness, swelling, pus or drainage from your surgical wound or a bad smell from the wound.    Lightheadedness, fainting or confusion.    Persistent vomiting (throwing up) and are not able to eat or drink for 12 hours.    Three or more loose, watery bowel movements in 24 hours (diarrhea).   Difficulty or pain while urinating( after non-urological surgery)    Pain and swelling in your legs, especially if it is only on one side.    Difficulty breathing or are breathing faster than normal.    Any new concerning symptoms.        Patient Information: Pre-Operative Infection Prevention Measures     Why did I have my nose, under my arms, and groin swabbed?  The purpose of the swab is to identify Staphylococcus aureus inside your nose or on your skin.  The swab was sent to the laboratory for culture.  A positive swab/culture for Staphylococcus aureus is called colonization or carriage.      What is Staphylococcus aureus?  Staphylococcus aureus, also known as “staph”, is a germ found on the skin or in the nose of healthy people.  Sometimes Staphylococcus aureus can get into the body and cause an infection.  This can be minor (such as pimples, boils, or other skin problems).  It might also be serious (such as a blood infection, pneumonia, or a surgical site infection).    What is Staphylococcus aureus colonization or carriage?  Colonization or carriage means that a person has the germ but is not sick from it.  These bacteria can be spread on the hands or when breathing or sneezing.    How is Staphylococcus aureus spread?  It is most often spread by close contact with a person or item that carries it.    What happens if my culture is positive for Staphylococcus aureus?  Your  doctor/medical team will use this information to guide any antibiotic treatment which may be necessary.  Regardless of the culture results, we will clean the inside of your nose with a betadine swab just before you have your surgery.      Will I get an infection if I have Staphylococcus aureus in my nose or on my skin?  Anyone can get an infection with Staphylococcus aureus.  However, the best way to reduce your risk of infection is to follow the instructions provided to you for the use of your CHG soap and dental rinse.        Patient Information: Oral/Dental Rinse    What is oral/dental rinse?   It is a mouthwash. It is a way of cleaning the mouth with a germ-killing solution before your surgery.  The solution contains chlorhexidine, commonly known as CHG.   It is used inside the mouth to kill a bacteria known as Staphylococcus aureus.  Let your doctor know if you are allergic to Chlorhexidine.    Why do I need to use CHG oral/dental rinse?  The CHG oral/dental rinse helps to kill a bacteria in your mouth known as Staphylococcus aureus.     This reduces the risk of infection at the surgical site.      Using your CHG oral/dental rinse  STEPS:  Use your CHG oral/dental rinse after you brush your teeth the night before (at bedtime) and the morning of your surgery.  Follow all directions on your prescription label.    Use the cap on the container to measure 15ml   Swish (gargle if you can) the mouthwash in your mouth for at least 30 seconds, (do not swallow) and spit out  After you use your CHG rinse, do not rinse your mouth with water, drink or eat.  Please refer to the prescription label for the appropriate time to resume oral intake      What side effects might I have using the CHG oral/dental rinse?  CHG rinse will stick to plaque on the teeth.  Brush and floss just before use.  Teeth brushing will help avoid staining of plaque during use.      Patient Information: Home Preoperative Antibacterial Shower      What  is a home preoperative antibacterial shower?  This shower is a way of cleaning the skin with a germ-killing solution before surgery.  The solution contains chlorhexidine, commonly known as CHG.  CHG is a skin cleanser with germ-killing ability.  Let your doctor know if you are allergic to chlorhexidine.    Why do I need to take a preoperative antibacterial shower?  Skin is not sterile.  It is best to try to make your skin as free of germs as possible before surgery.  Proper cleansing with a germ-killing soap before surgery can lower the number of germs on your skin.  This helps to reduce the risk of infection at the surgical site.  Following the instructions listed below will help you prepare your skin for surgery.      How do I use the solution?  Steps:  Begin using your CHG soap 5 days before your scheduled surgery on ________________________.    First, wash and rinse your hair using the CHG soap. Keep CHG soap away from ear canals and eyes.  Rinse completely, do not condition.  Hair extensions should be removed.  Wash your face with your normal soap and rinse.    Apply the CHG solution to a clean wet washcloth.  Turn the water off or move away from the water spray to avoid premature rinsing of the CHG soap as you are applying.   Firmly lather your entire body from the neck down.  Do not use on your face.  Pay special attention to the area(s) where your incision(s) will be located unless they are on your face.  Avoid scrubbing your skin too hard.  The important point is to have the CHG soap sit on your skin for 3 minutes.    When the 3 minutes are up, turn on the water and rinse the CHG solution off your body completely.   DO NOT wash with regular soap after you have used the CHG soap solution  Pat yourself dry with a clean, freshly-laundered towel.  DO NOT apply powders, deodorants, or lotions.  Dress in clean, freshly laundered nightclothes.    Be sure to sleep with clean, freshly laundered sheets.  Be aware  that CHG will cause stains on fabrics; if you wash them with bleach after use.  Rinse your washcloth and other linens that have contact with CHG completely.  Use only non-chlorine detergents to launder the items used.   The morning of surgery is the fifth day.  Repeat the above steps and dress in clean comfortable clothing     Whom should I contact if I have any questions regarding the use of CHG soap?  Call the [facility] at [phone] if you have any questions.                  Medication List            Accurate as of June 17, 2025  7:15 AM. Always use your most recent med list.                acetaminophen 500 mg tablet  Commonly known as: Tylenol  Medication Adjustments for Surgery: Take/Use as prescribed     ALPRAZolam 0.25 mg tablet  Commonly known as: Xanax  Take 1 tablet by mouth nightly at bedtime as needed for sleep  Medication Adjustments for Surgery: Take/Use as prescribed     * buPROPion  mg 24 hr tablet  Commonly known as: Wellbutrin XL  Take 1 tablet by mouth every morning  Medication Adjustments for Surgery: Take/Use as prescribed     * buPROPion  mg 24 hr tablet  Commonly known as: Wellbutrin XL  Take 1 tablet by mouth every morning  Medication Adjustments for Surgery: Take/Use as prescribed     * buPROPion  mg 24 hr tablet  Commonly known as: Wellbutrin XL  Take 1 tablet (150 mg) by mouth once daily in the morning. Take before meals.     * buPROPion  mg 24 hr tablet  Commonly known as: Wellbutrin XL  Take 1 tablet (300 mg) by mouth once daily in the morning. Take before meals.     * buPROPion  mg 24 hr tablet  Commonly known as: Wellbutrin XL  Take 1 Tablet by mouth every morning     * buPROPion  mg 24 hr tablet  Commonly known as: Wellbutrin XL  Take 1 tablet (300 mg) by mouth once daily in the morning. Take before meals.     * DULoxetine 60 mg DR capsule  Commonly known as: Cymbalta  Take  1 capsule by mouth 2 (two) times daily  Medication Adjustments for Surgery: Take/Use as prescribed     * DULoxetine 60 mg DR capsule  Commonly known as: Cymbalta  Take 1 capsule (60 mg) by mouth 2 times a day.     * DULoxetine 60 mg DR capsule  Commonly known as: Cymbalta  Take 1 capsule (60 mg) by mouth 2 times a day.     ICAPS AREDS ORAL  Additional Medication Adjustments for Surgery: Take last dose 7 days before surgery     * lamoTRIgine 200 mg tablet  Commonly known as: LaMICtal  Take 2 Tablets by mouth once daily  Medication Adjustments for Surgery: Take/Use as prescribed     * lamoTRIgine 200 mg tablet  Commonly known as: LaMICtal  Take 2 tablets (400 mg) by mouth once daily.     latanoprost 0.005 % ophthalmic solution  Commonly known as: Xalatan  Medication Adjustments for Surgery: Take/Use as prescribed     * lurasidone 20 mg tablet  Commonly known as: Latuda  Take 1 tablet (20 mg) by mouth once daily in the evening. Take with meals.  Medication Adjustments for Surgery: Take/Use as prescribed     * lurasidone 20 mg tablet  Commonly known as: Latuda  Take 1 Tablet by mouth once daily with dinner     * lurasidone 20 mg tablet  Commonly known as: Latuda  Take 1 tablet (20 mg) by mouth once daily in the evening. Take with meals.     * lurasidone 20 mg tablet  Commonly known as: Latuda  Take 1 tablet (20 mg) by mouth once daily in the evening. Take with meals.     * methylphenidate ER 36 mg extended release tablet  Take 1 tablet (36 mg) by mouth 2 times a day.  Medication Adjustments for Surgery: Do Not take on the morning of surgery     * methylphenidate ER 36 mg extended release tablet  Take 1 tablet (36 mg) by mouth 2 times a day. Do not fill before June 12, 2025.  Medication Adjustments for Surgery: Do Not take on the morning of surgery     * methylphenidate ER 36 mg extended release tablet  Take 1 tablet (36 mg) by mouth 2 times a day. Do not fill before July 12, 2025.  Start taking on: July 12,  2025  Medication Adjustments for Surgery: Do Not take on the morning of surgery     * OLANZapine 2.5 mg tablet  Commonly known as: ZyPREXA  Take 0.5 Tablets by mouth 3 (three) times daily  Medication Adjustments for Surgery: Take/Use as prescribed     * OLANZapine 2.5 mg tablet  Commonly known as: ZyPREXA  Take 0.5 Tablets by mouth 3 (three) times daily     * OLANZapine 2.5 mg tablet  Commonly known as: ZyPREXA  Take 0.5 Tablets by mouth 3 (three) times daily     * OLANZapine 2.5 mg tablet  Commonly known as: ZyPREXA  Take 0.5 tablets (1.25 mg) by mouth 3 times a day.     * topiramate 50 mg tablet  Commonly known as: Topamax  Take 1 tablet (50 mg) by mouth daily (with 25mg to equal 75mg)  Notes to patient: NOT TAKING     * topiramate 50 mg tablet  Commonly known as: Topamax  Take 1 Tablet by mouth 2 (two) times daily  Notes to patient: NOT TAKING     * topiramate 50 mg tablet  Commonly known as: Topamax  Take 1 tablet (50 mg) by mouth once daily at bedtime.  Medication Adjustments for Surgery: Take/Use as prescribed           * This list has 25 medication(s) that are the same as other medications prescribed for you. Read the directions carefully, and ask your doctor or other care provider to review them with you.

## 2025-06-17 NOTE — H&P (VIEW-ONLY)
"CPM/PAT Evaluation       Name: Clarisa Barboza (Clarisa Barboza)  /Age: 1970/55 y.o.     In-Person       Chief Complaint: Rupture of right distal biceps tendon     HPI  A 55-year-old female with rupture of right distal biceps tendon.  History of right elbow injury 2025 while working out and lifting weights \"I felt a pop\". She has had continued sharp right elbow pain and weakness worse with physical activities and lifting interfering with ADLs.  An MRI of the right elbow on 2025 revealed: High-grade partial-thickness tear distal biceps tendon at the radial tuberosity insertion.  Denies fever, chills, chest pain, shortness of breath, syncope, or right upper extremity numbness.  She is scheduled for right distal biceps tendon repair.      Medical History[1]    Surgical History[2]      Allergies[3]    Current Medications[4]       Review of Systems   Constitutional:  Positive for activity change.   HENT: Negative.     Eyes: Negative.         Glasses   Respiratory: Negative.     Cardiovascular: Negative.    Gastrointestinal: Negative.    Genitourinary: Negative.    Musculoskeletal:  Positive for arthralgias.        Right elbow pain and weakness   Skin: Negative.    Neurological: Negative.    Psychiatric/Behavioral: Negative.          Physical Exam  Vitals reviewed.   HENT:      Head: Normocephalic and atraumatic.      Mouth/Throat:      Mouth: Mucous membranes are moist.   Eyes:      Pupils: Pupils are equal, round, and reactive to light.      Comments: glasses   Cardiovascular:      Rate and Rhythm: Normal rate and regular rhythm.   Pulmonary:      Effort: Pulmonary effort is normal.      Breath sounds: Normal breath sounds.   Abdominal:      Palpations: Abdomen is soft.   Musculoskeletal:         General: Normal range of motion.      Cervical back: Normal range of motion.      Comments: Decreased rom right upper extremity   Skin:     General: Skin is warm and dry.   Neurological:      Mental Status: She " is alert and oriented to person, place, and time.   Psychiatric:         Mood and Affect: Mood normal.          PAT AIRWAY:   Airway:     Mallampati::  II    Neck ROM::  Full  normal          /68   Pulse 88   Temp 36.9 °C (98.4 °F) (Temporal)   Resp 16   Ht 1.524 m (5')   Wt 80.3 kg (177 lb)   SpO2 99%   BMI 34.57 kg/m²         Stop Bang Score 3     Flowsheet Row Most Recent Value   Do you snore loudly? 1   Do you often feel tired or fatigued after your sleep? 1   Has anyone ever observed you stop breathing in your sleep? 0   Do you have or are you being treated for high blood pressure? 1   Recent BMI (Calculated) 25.2   Is BMI greater than 35 kg/m2? 0=No   Age older than 50 years old? 1=Yes   Is your neck circumference greater than 17 inches (Male) or 16 inches (Female)? 0        CHADS: 1.9%  DASI risk score: 58.2  METS: 9.9  ARMANDO: 0.14%  RCRI:0.4%  ASA: II  ARISCAT:1.6% score 3  PAT orders CBC, BMP, MRSA    Assessment and Plan:     Rupture of right distal biceps tendon Plan: Right distal biceps tendon repair.  Bipolar/depression/anxiety/ADHD managed with alprazolam, bupropion, duloxetine, lamotrigine, Latuda, Concerta, olanzapine  Migraine takes Topiramate  Reports history of sleep apnea-resolved since weight loss of 40+ pounds-reports no need for cpap  BMI 34.57             [1]   Past Medical History:  Diagnosis Date    Ankle sprain 1988    Anxiety     Bipolar disorder     Bunion     Cataract 2022    CPAP (continuous positive airway pressure) dependence     CTS (carpal tunnel syndrome) 2007    Depression     Fracture of ankle 2013    Fracture of wrist 2020    Fracture, foot 1985    Glaucoma 2022    Ocular hypertension    Obesity     Due to taking Olanzapine which increases hunger and carb cravings    Personal history of other diseases of the nervous system and sense organs     History of sleep apnea    Skin disorder 2025    Eczema    Sleep apnea    [2]   Past Surgical History:  Procedure Laterality  Date    CARPAL TUNNEL RELEASE  06/10/2013    Neuroplasty Decompression Median Nerve At Carpal Tunnel    HAND SURGERY  06/10/2013    Hand Surgery                                                                                                                                                          MOUTH SURGERY  06/10/2013    Oral Surgery Tooth Extraction    TONSILLECTOMY  06/10/2013    Tonsillectomy    TRIGGER FINGER RELEASE  06/10/13    Right thumb    WISDOM TOOTH EXTRACTION  01/01/1989   [3]   Allergies  Allergen Reactions    Sulfa (Sulfonamide Antibiotics) Hives   [4]   Current Outpatient Medications   Medication Sig Dispense Refill    ALPRAZolam (Xanax) 0.25 mg tablet Take 1 tablet by mouth nightly at bedtime as needed for sleep 30 tablet 1    buPROPion XL (Wellbutrin XL) 150 mg 24 hr tablet Take 1 tablet by mouth every morning 90 tablet 1    buPROPion XL (Wellbutrin XL) 150 mg 24 hr tablet Take 1 tablet (150 mg) by mouth once daily in the morning. Take before meals. 90 tablet 1    buPROPion XL (Wellbutrin XL) 150 mg 24 hr tablet Take 1 Tablet by mouth every morning 90 tablet 1    buPROPion XL (Wellbutrin XL) 300 mg 24 hr tablet Take 1 tablet by mouth every morning 90 tablet 0    buPROPion XL (Wellbutrin XL) 300 mg 24 hr tablet Take 1 tablet (300 mg) by mouth once daily in the morning. Take before meals. 90 tablet 0    buPROPion XL (Wellbutrin XL) 300 mg 24 hr tablet Take 1 tablet (300 mg) by mouth once daily in the morning. Take before meals. 90 tablet 0    DULoxetine (Cymbalta) 60 mg DR capsule Take 1 capsule by mouth 2 (two) times daily 180 capsule 1    DULoxetine (Cymbalta) 60 mg DR capsule Take 1 capsule (60 mg) by mouth 2 times a day. 180 capsule 1    DULoxetine (Cymbalta) 60 mg DR capsule Take 1 capsule (60 mg) by mouth 2 times a day. 180 capsule 1    lamoTRIgine (LaMICtal) 200 mg tablet Take 2 Tablets by mouth once daily 180 tablet 0    lamoTRIgine (LaMICtal) 200 mg tablet Take 2 tablets (400 mg) by  mouth once daily. 180 tablet 0    latanoprost (Xalatan) 0.005 % ophthalmic solution Administer 1 drop into both eyes once daily.      lurasidone (Latuda) 20 mg tablet Take 1 tablet (20 mg) by mouth once daily in the evening. Take with meals. 30 tablet 1    lurasidone (Latuda) 20 mg tablet Take 1 Tablet by mouth once daily with dinner 90 tablet 1    lurasidone (Latuda) 20 mg tablet Take 1 tablet (20 mg) by mouth once daily in the evening. Take with meals. 90 tablet 1    lurasidone (Latuda) 20 mg tablet Take 1 tablet (20 mg) by mouth once daily in the evening. Take with meals. 90 tablet 1    methylphenidate ER (Concerta) 36 mg daily tablet Take 2 tablets (72 mg) by mouth once daily in the morning. Take before meals. 60 tablet 0    [START ON 7/12/2025] methylphenidate ER 36 mg extended release tablet Take 1 tablet (36 mg) by mouth 2 times a day. Do not fill before July 12, 2025. 60 tablet 0    methylphenidate ER 36 mg extended release tablet Take 1 tablet (36 mg) by mouth 2 times a day. Do not fill before June 12, 2025. 60 tablet 0    methylphenidate ER 36 mg extended release tablet Take 1 tablet (36 mg) by mouth 2 times a day. 60 tablet 0    methylPREDNISolone (Medrol Dospak) 4 mg tablets Follow printed directions on package. 21 each 0    OLANZapine (ZyPREXA) 2.5 mg tablet Take 0.5 Tablets by mouth 3 (three) times daily 135 tablet 1    OLANZapine (ZyPREXA) 2.5 mg tablet Take 0.5 Tablets by mouth 3 (three) times daily 135 tablet 1    OLANZapine (ZyPREXA) 2.5 mg tablet Take 0.5 Tablets by mouth 3 (three) times daily 135 tablet 1    OLANZapine (ZyPREXA) 2.5 mg tablet Take 0.5 tablets (1.25 mg) by mouth 3 times a day. 135 tablet 1    OLANZapine (ZyPREXA) 5 mg tablet Take 1 tablet (5 mg) by mouth 2 times a day. Morning and mid-day (Patient not taking: Reported on 12/22/2024) 60 tablet 2    permethrin (Elimite) 5 % cream Apply thin layer from neck to toes. Bathe to remove drug after 8-14 hrs. Repeat in 7 days if still  present.. 240 g 1    topiramate (Topamax) 25 mg tablet Take 3 tablets (75 mg) by mouth once daily at bedtime. (Patient taking differently: Take 25 mg by mouth once daily.) 9 tablet 0    topiramate (Topamax) 50 mg tablet Take 1 tablet (50 mg) by mouth daily (with 25mg to equal 75mg) 30 tablet 0    topiramate (Topamax) 50 mg tablet Take 1 Tablet by mouth 2 (two) times daily 30 tablet 1    topiramate (Topamax) 50 mg tablet Take 1 tablet (50 mg) by mouth once daily at bedtime. 30 tablet 1    triamcinolone (Kenalog) 0.1 % cream Apply sparingly to affected area 2 times daily 454 g 1    triamcinolone (Kenalog) 0.1 % ointment Apply topically 2 times a day. 15 g 0    vit A/vit C/vit E/zinc/copper (ICAPS AREDS ORAL) Take 500 mg by mouth once daily.       No current facility-administered medications for this visit.

## 2025-06-18 ENCOUNTER — PHARMACY VISIT (OUTPATIENT)
Dept: PHARMACY | Facility: CLINIC | Age: 55
End: 2025-06-18
Payer: COMMERCIAL

## 2025-06-19 ENCOUNTER — HOSPITAL ENCOUNTER (OUTPATIENT)
Facility: HOSPITAL | Age: 55
Setting detail: OUTPATIENT SURGERY
Discharge: HOME | End: 2025-06-19
Attending: STUDENT IN AN ORGANIZED HEALTH CARE EDUCATION/TRAINING PROGRAM | Admitting: STUDENT IN AN ORGANIZED HEALTH CARE EDUCATION/TRAINING PROGRAM
Payer: COMMERCIAL

## 2025-06-19 ENCOUNTER — ANESTHESIA EVENT (OUTPATIENT)
Dept: OPERATING ROOM | Facility: HOSPITAL | Age: 55
End: 2025-06-19
Payer: COMMERCIAL

## 2025-06-19 ENCOUNTER — PHARMACY VISIT (OUTPATIENT)
Dept: PHARMACY | Facility: CLINIC | Age: 55
End: 2025-06-19
Payer: COMMERCIAL

## 2025-06-19 ENCOUNTER — APPOINTMENT (OUTPATIENT)
Facility: CLINIC | Age: 55
End: 2025-06-19
Payer: COMMERCIAL

## 2025-06-19 ENCOUNTER — ANESTHESIA (OUTPATIENT)
Dept: OPERATING ROOM | Facility: HOSPITAL | Age: 55
End: 2025-06-19
Payer: COMMERCIAL

## 2025-06-19 VITALS
DIASTOLIC BLOOD PRESSURE: 78 MMHG | TEMPERATURE: 97.9 F | RESPIRATION RATE: 16 BRPM | BODY MASS INDEX: 34.67 KG/M2 | HEART RATE: 78 BPM | WEIGHT: 176.59 LBS | SYSTOLIC BLOOD PRESSURE: 121 MMHG | HEIGHT: 60 IN | OXYGEN SATURATION: 98 %

## 2025-06-19 DIAGNOSIS — S46.211A RUPTURE OF RIGHT DISTAL BICEPS TENDON, INITIAL ENCOUNTER: Primary | ICD-10-CM

## 2025-06-19 LAB — STAPHYLOCOCCUS SPEC CULT: NORMAL

## 2025-06-19 PROCEDURE — 2500000005 HC RX 250 GENERAL PHARMACY W/O HCPCS: Performed by: STUDENT IN AN ORGANIZED HEALTH CARE EDUCATION/TRAINING PROGRAM

## 2025-06-19 PROCEDURE — 7100000001 HC RECOVERY ROOM TIME - INITIAL BASE CHARGE: Performed by: STUDENT IN AN ORGANIZED HEALTH CARE EDUCATION/TRAINING PROGRAM

## 2025-06-19 PROCEDURE — 64415 NJX AA&/STRD BRCH PLXS IMG: CPT | Performed by: ANESTHESIOLOGY

## 2025-06-19 PROCEDURE — 3700000002 HC GENERAL ANESTHESIA TIME - EACH INCREMENTAL 1 MINUTE: Performed by: STUDENT IN AN ORGANIZED HEALTH CARE EDUCATION/TRAINING PROGRAM

## 2025-06-19 PROCEDURE — A24342 PR REINSERT BI/TRICEPS TENDON,DISTAL: Performed by: NURSE ANESTHETIST, CERTIFIED REGISTERED

## 2025-06-19 PROCEDURE — 2500000004 HC RX 250 GENERAL PHARMACY W/ HCPCS (ALT 636 FOR OP/ED)

## 2025-06-19 PROCEDURE — 24342 REPAIR OF RUPTURED TENDON: CPT | Performed by: STUDENT IN AN ORGANIZED HEALTH CARE EDUCATION/TRAINING PROGRAM

## 2025-06-19 PROCEDURE — 2500000005 HC RX 250 GENERAL PHARMACY W/O HCPCS: Performed by: NURSE ANESTHETIST, CERTIFIED REGISTERED

## 2025-06-19 PROCEDURE — A24342 PR REINSERT BI/TRICEPS TENDON,DISTAL: Performed by: STUDENT IN AN ORGANIZED HEALTH CARE EDUCATION/TRAINING PROGRAM

## 2025-06-19 PROCEDURE — 7100000009 HC PHASE TWO TIME - INITIAL BASE CHARGE: Performed by: STUDENT IN AN ORGANIZED HEALTH CARE EDUCATION/TRAINING PROGRAM

## 2025-06-19 PROCEDURE — 7100000002 HC RECOVERY ROOM TIME - EACH INCREMENTAL 1 MINUTE: Performed by: STUDENT IN AN ORGANIZED HEALTH CARE EDUCATION/TRAINING PROGRAM

## 2025-06-19 PROCEDURE — 2500000004 HC RX 250 GENERAL PHARMACY W/ HCPCS (ALT 636 FOR OP/ED): Mod: JW | Performed by: STUDENT IN AN ORGANIZED HEALTH CARE EDUCATION/TRAINING PROGRAM

## 2025-06-19 PROCEDURE — 3600000003 HC OR TIME - INITIAL BASE CHARGE - PROCEDURE LEVEL THREE: Performed by: STUDENT IN AN ORGANIZED HEALTH CARE EDUCATION/TRAINING PROGRAM

## 2025-06-19 PROCEDURE — 3600000008 HC OR TIME - EACH INCREMENTAL 1 MINUTE - PROCEDURE LEVEL THREE: Performed by: STUDENT IN AN ORGANIZED HEALTH CARE EDUCATION/TRAINING PROGRAM

## 2025-06-19 PROCEDURE — 2500000004 HC RX 250 GENERAL PHARMACY W/ HCPCS (ALT 636 FOR OP/ED): Performed by: NURSE ANESTHETIST, CERTIFIED REGISTERED

## 2025-06-19 PROCEDURE — C1713 ANCHOR/SCREW BN/BN,TIS/BN: HCPCS | Performed by: STUDENT IN AN ORGANIZED HEALTH CARE EDUCATION/TRAINING PROGRAM

## 2025-06-19 PROCEDURE — 3700000001 HC GENERAL ANESTHESIA TIME - INITIAL BASE CHARGE: Performed by: STUDENT IN AN ORGANIZED HEALTH CARE EDUCATION/TRAINING PROGRAM

## 2025-06-19 PROCEDURE — RXMED WILLOW AMBULATORY MEDICATION CHARGE

## 2025-06-19 PROCEDURE — 7100000010 HC PHASE TWO TIME - EACH INCREMENTAL 1 MINUTE: Performed by: STUDENT IN AN ORGANIZED HEALTH CARE EDUCATION/TRAINING PROGRAM

## 2025-06-19 PROCEDURE — 2500000004 HC RX 250 GENERAL PHARMACY W/ HCPCS (ALT 636 FOR OP/ED): Performed by: ANESTHESIOLOGY

## 2025-06-19 PROCEDURE — 2780000003 HC OR 278 NO HCPCS: Performed by: STUDENT IN AN ORGANIZED HEALTH CARE EDUCATION/TRAINING PROGRAM

## 2025-06-19 RX ORDER — FENTANYL CITRATE 50 UG/ML
50 INJECTION, SOLUTION INTRAMUSCULAR; INTRAVENOUS ONCE
Status: COMPLETED | OUTPATIENT
Start: 2025-06-19 | End: 2025-06-19

## 2025-06-19 RX ORDER — TRANEXAMIC ACID 1 G/10ML
INJECTION, SOLUTION INTRAVENOUS AS NEEDED
Status: DISCONTINUED | OUTPATIENT
Start: 2025-06-19 | End: 2025-06-19

## 2025-06-19 RX ORDER — ACETAMINOPHEN 500 MG
1000 TABLET ORAL EVERY 6 HOURS PRN
Qty: 30 TABLET | Refills: 2 | Status: SHIPPED | OUTPATIENT
Start: 2025-06-19 | End: 2025-06-30

## 2025-06-19 RX ORDER — PROPOFOL 10 MG/ML
INJECTION, EMULSION INTRAVENOUS AS NEEDED
Status: DISCONTINUED | OUTPATIENT
Start: 2025-06-19 | End: 2025-06-19

## 2025-06-19 RX ORDER — ONDANSETRON HYDROCHLORIDE 2 MG/ML
4 INJECTION, SOLUTION INTRAVENOUS ONCE AS NEEDED
Status: DISCONTINUED | OUTPATIENT
Start: 2025-06-19 | End: 2025-06-19 | Stop reason: HOSPADM

## 2025-06-19 RX ORDER — OXYCODONE HYDROCHLORIDE 5 MG/1
10 TABLET ORAL EVERY 4 HOURS PRN
Status: DISCONTINUED | OUTPATIENT
Start: 2025-06-19 | End: 2025-06-19 | Stop reason: HOSPADM

## 2025-06-19 RX ORDER — ONDANSETRON 4 MG/1
4 TABLET, FILM COATED ORAL EVERY 8 HOURS PRN
Qty: 30 TABLET | Refills: 2 | Status: SHIPPED | OUTPATIENT
Start: 2025-06-19 | End: 2025-07-19

## 2025-06-19 RX ORDER — DEXMEDETOMIDINE IN 0.9 % NACL 20 MCG/5ML
SYRINGE (ML) INTRAVENOUS
Status: COMPLETED | OUTPATIENT
Start: 2025-06-19 | End: 2025-06-19

## 2025-06-19 RX ORDER — MIDAZOLAM HYDROCHLORIDE 1 MG/ML
2 INJECTION, SOLUTION INTRAMUSCULAR; INTRAVENOUS ONCE
Status: COMPLETED | OUTPATIENT
Start: 2025-06-19 | End: 2025-06-19

## 2025-06-19 RX ORDER — DROPERIDOL 2.5 MG/ML
0.62 INJECTION, SOLUTION INTRAMUSCULAR; INTRAVENOUS ONCE AS NEEDED
Status: DISCONTINUED | OUTPATIENT
Start: 2025-06-19 | End: 2025-06-19 | Stop reason: HOSPADM

## 2025-06-19 RX ORDER — ACETAMINOPHEN 325 MG/1
650 TABLET ORAL EVERY 4 HOURS PRN
Status: DISCONTINUED | OUTPATIENT
Start: 2025-06-19 | End: 2025-06-19 | Stop reason: HOSPADM

## 2025-06-19 RX ORDER — DOCUSATE SODIUM 100 MG/1
100 CAPSULE, LIQUID FILLED ORAL 2 TIMES DAILY
Qty: 30 CAPSULE | Refills: 1 | Status: SHIPPED | OUTPATIENT
Start: 2025-06-19 | End: 2025-07-19

## 2025-06-19 RX ORDER — ASPIRIN 81 MG/1
81 TABLET ORAL DAILY
Qty: 14 TABLET | Refills: 0 | Status: SHIPPED | OUTPATIENT
Start: 2025-06-19 | End: 2025-07-03

## 2025-06-19 RX ORDER — FENTANYL CITRATE 50 UG/ML
INJECTION, SOLUTION INTRAMUSCULAR; INTRAVENOUS CONTINUOUS PRN
Status: DISCONTINUED | OUTPATIENT
Start: 2025-06-19 | End: 2025-06-19

## 2025-06-19 RX ORDER — OXYCODONE HYDROCHLORIDE 5 MG/1
5 TABLET ORAL EVERY 6 HOURS PRN
Qty: 28 TABLET | Refills: 0 | Status: SHIPPED | OUTPATIENT
Start: 2025-06-19 | End: 2025-06-23 | Stop reason: SDUPTHER

## 2025-06-19 RX ORDER — ONDANSETRON HYDROCHLORIDE 2 MG/ML
INJECTION, SOLUTION INTRAVENOUS AS NEEDED
Status: DISCONTINUED | OUTPATIENT
Start: 2025-06-19 | End: 2025-06-19

## 2025-06-19 RX ORDER — OXYCODONE HYDROCHLORIDE 5 MG/1
5 TABLET ORAL EVERY 4 HOURS PRN
Status: DISCONTINUED | OUTPATIENT
Start: 2025-06-19 | End: 2025-06-19 | Stop reason: HOSPADM

## 2025-06-19 RX ORDER — CEFAZOLIN SODIUM 2 G/100ML
2 INJECTION, SOLUTION INTRAVENOUS ONCE
Status: DISCONTINUED | OUTPATIENT
Start: 2025-06-19 | End: 2025-06-19 | Stop reason: HOSPADM

## 2025-06-19 RX ORDER — HYDROMORPHONE HYDROCHLORIDE 0.2 MG/ML
0.2 INJECTION INTRAMUSCULAR; INTRAVENOUS; SUBCUTANEOUS EVERY 5 MIN PRN
Status: DISCONTINUED | OUTPATIENT
Start: 2025-06-19 | End: 2025-06-19 | Stop reason: HOSPADM

## 2025-06-19 RX ORDER — ROPIVACAINE HYDROCHLORIDE 5 MG/ML
INJECTION, SOLUTION EPIDURAL; INFILTRATION; PERINEURAL
Status: COMPLETED | OUTPATIENT
Start: 2025-06-19 | End: 2025-06-19

## 2025-06-19 RX ORDER — MIDAZOLAM HYDROCHLORIDE 1 MG/ML
INJECTION, SOLUTION INTRAMUSCULAR; INTRAVENOUS CONTINUOUS PRN
Status: DISCONTINUED | OUTPATIENT
Start: 2025-06-19 | End: 2025-06-19

## 2025-06-19 RX ORDER — KETOROLAC TROMETHAMINE 30 MG/ML
INJECTION, SOLUTION INTRAMUSCULAR; INTRAVENOUS AS NEEDED
Status: DISCONTINUED | OUTPATIENT
Start: 2025-06-19 | End: 2025-06-19

## 2025-06-19 RX ORDER — CEFAZOLIN 1 G/1
INJECTION, POWDER, FOR SOLUTION INTRAVENOUS AS NEEDED
Status: DISCONTINUED | OUTPATIENT
Start: 2025-06-19 | End: 2025-06-19

## 2025-06-19 RX ORDER — LIDOCAINE HYDROCHLORIDE 10 MG/ML
INJECTION, SOLUTION EPIDURAL; INFILTRATION; INTRACAUDAL; PERINEURAL AS NEEDED
Status: DISCONTINUED | OUTPATIENT
Start: 2025-06-19 | End: 2025-06-19

## 2025-06-19 RX ORDER — ALBUTEROL SULFATE 0.83 MG/ML
2.5 SOLUTION RESPIRATORY (INHALATION) ONCE AS NEEDED
Status: DISCONTINUED | OUTPATIENT
Start: 2025-06-19 | End: 2025-06-19 | Stop reason: HOSPADM

## 2025-06-19 RX ADMIN — CEFAZOLIN 2 G: 330 INJECTION, POWDER, FOR SOLUTION INTRAMUSCULAR; INTRAVENOUS at 13:00

## 2025-06-19 RX ADMIN — ROPIVACAINE HYDROCHLORIDE 20 ML: 5 INJECTION, SOLUTION EPIDURAL; INFILTRATION; PERINEURAL at 12:43

## 2025-06-19 RX ADMIN — TRANEXAMIC ACID 1000 MG: 1 INJECTION, SOLUTION INTRAVENOUS at 13:04

## 2025-06-19 RX ADMIN — Medication 4 MCG: at 12:43

## 2025-06-19 RX ADMIN — DEXAMETHASONE SODIUM PHOSPHATE 5 MG: 4 INJECTION, SOLUTION INTRAMUSCULAR; INTRAVENOUS at 12:43

## 2025-06-19 RX ADMIN — KETOROLAC TROMETHAMINE 30 MG: 30 INJECTION, SOLUTION INTRAMUSCULAR at 13:47

## 2025-06-19 RX ADMIN — LIDOCAINE HYDROCHLORIDE 5 ML: 10 INJECTION, SOLUTION EPIDURAL; INFILTRATION; INTRACAUDAL; PERINEURAL at 12:58

## 2025-06-19 RX ADMIN — FENTANYL CITRATE 50 MCG: 50 INJECTION INTRAMUSCULAR; INTRAVENOUS at 12:39

## 2025-06-19 RX ADMIN — POVIDONE-IODINE 1 APPLICATION: 5 SOLUTION TOPICAL at 12:06

## 2025-06-19 RX ADMIN — MIDAZOLAM 2 MG: 1 INJECTION INTRAMUSCULAR; INTRAVENOUS at 12:39

## 2025-06-19 RX ADMIN — SODIUM CHLORIDE, POTASSIUM CHLORIDE, SODIUM LACTATE AND CALCIUM CHLORIDE: 600; 310; 30; 20 INJECTION, SOLUTION INTRAVENOUS at 12:52

## 2025-06-19 RX ADMIN — PROPOFOL 200 MG: 10 INJECTION, EMULSION INTRAVENOUS at 12:58

## 2025-06-19 RX ADMIN — ONDANSETRON 4 MG: 2 INJECTION, SOLUTION INTRAMUSCULAR; INTRAVENOUS at 13:37

## 2025-06-19 ASSESSMENT — PAIN - FUNCTIONAL ASSESSMENT
PAIN_FUNCTIONAL_ASSESSMENT: WONG-BAKER FACES
PAIN_FUNCTIONAL_ASSESSMENT: WONG-BAKER FACES
PAIN_FUNCTIONAL_ASSESSMENT: 0-10
PAIN_FUNCTIONAL_ASSESSMENT: 0-10
PAIN_FUNCTIONAL_ASSESSMENT: WONG-BAKER FACES
PAIN_FUNCTIONAL_ASSESSMENT: 0-10

## 2025-06-19 ASSESSMENT — PAIN SCALES - GENERAL
PAINLEVEL_OUTOF10: 2
PAINLEVEL_OUTOF10: 0 - NO PAIN

## 2025-06-19 ASSESSMENT — COLUMBIA-SUICIDE SEVERITY RATING SCALE - C-SSRS
2. HAVE YOU ACTUALLY HAD ANY THOUGHTS OF KILLING YOURSELF?: NO
1. IN THE PAST MONTH, HAVE YOU WISHED YOU WERE DEAD OR WISHED YOU COULD GO TO SLEEP AND NOT WAKE UP?: NO
6. HAVE YOU EVER DONE ANYTHING, STARTED TO DO ANYTHING, OR PREPARED TO DO ANYTHING TO END YOUR LIFE?: NO

## 2025-06-19 ASSESSMENT — PAIN DESCRIPTION - DESCRIPTORS
DESCRIPTORS: SORE
DESCRIPTORS: DULL;ACHING

## 2025-06-19 NOTE — DISCHARGE INSTRUCTIONS
Shoulder and Elbow Service  Renny Mendez MD    Discharge Instructions after Distal Bicep Tendon Repair     A sling and a splint have been provided for you. Remain in your splint at all times.     DO NOT remove your splint or any dressing until you are seen by the surgical team at your follow up visit.    Use ice on the elbow intermittently over the first 72 hours up to the first 2 weeks following surgery.   Pain medication has been prescribed for you. Use your medicine liberally over the first 72 hours and only take if you are experiencing pain. You can then begin to taper your     use. You may take Extra Strength Tylenol or Tylenol only in place of the pain pills. *DO NOT TAKE ANY nonsteroidal anti-inflammatory pain medications: Advil,   Motrin, Ibuprofen, Aleve, Naproxen or Naprosyn.   You may shower after surgery. The dressing/splint CANNOT get wet during your shower. Wrap the dressing/splint with a dry towel and place your operative arm in a     large trash bag to help keep it dry.     Take the prescribed narcotic pain medication as needed.    Three times a day, work on active finger motion to help prevent swelling.     Please call the office at [phone] for any problems. Including the following:  - Excessive redness of the incision  - ANY drainage at or around incision  - Fever of more than 101.5 F        Please call [phone] to make a follow-up appointment if one has not already been made      for you. You should see Dr Mendez 10-14 days after your surgical procedure.

## 2025-06-19 NOTE — ANESTHESIA PREPROCEDURE EVALUATION
Patient: Clarisa Barboza    Procedure Information       Date/Time: 06/19/25 1245    Procedure: Repair distal biceps ** OK to add per Omar ** ( preop block, supine hand table, arthrex distal biceps kit ) (Right: Elbow) - OK to add per Omar    Location: [facility] / [facility]    Surgeons: Renny Mendez MD            Relevant Problems   Anesthesia (within normal limits)      Cardiac   (+) Mixed hyperlipidemia      Neuro   (+) Bipolar II disorder (Multi)       Clinical information reviewed:   Tobacco  Allergies  Meds   Med Hx  Surg Hx  OB Status  Fam Hx  Soc   Hx        NPO Detail:  NPO/Void Status  Carbohydrate Drink Given Prior to Surgery? : N  Date of Last Liquid: 06/18/25  Time of Last Liquid: 1000 (12 oz of water)  Date of Last Solid: 06/18/25  Time of Last Solid: 2359  Last Intake Type: Food  Time of Last Void: 1140         Physical Exam    Airway  Mallampati: IV  TM distance: >3 FB  Neck ROM: full  Mouth opening: 3 or more finger widths     Cardiovascular - normal exam   Dental - normal exam     Pulmonary - normal examBreath sounds clear to auscultation     Abdominal - normal exam           Anesthesia Plan    History of general anesthesia?: yes  History of complications of general anesthesia?: no    ASA 3     general     intravenous induction   Postoperative pain plan includes opioids.  Trial extubation is planned.  Anesthetic plan and risks discussed with patient.    Plan discussed with CRNA.

## 2025-06-19 NOTE — ANESTHESIA PROCEDURE NOTES
Airway  Date/Time: 6/19/2025 12:59 PM  Reason: elective      Staffing  Performed: CRNA   Authorized by: Florian Rivera DO    Performed by: LIZZY Taylor-CRUZ  Patient location during procedure: OR    Patient Condition  Indications for airway management: anesthesia  Patient position: sniffing  MILS not maintained throughout  Planned trial extubation    Final Airway Details   Preoxygenated: yes  Final airway type: supraglottic airway  Successful airway: classic  Size: 3  Number of attempts at approach: 1

## 2025-06-19 NOTE — ANESTHESIA POSTPROCEDURE EVALUATION
Patient: Clarisa Barboza    Procedure Summary       Date: 06/19/25 Room / Location: [facility]    Anesthesia Start: 1252 Anesthesia Stop: 1401    Procedure: Repair distal biceps ** OK to add per Omar ** ( preop block, supine hand table, arthrex distal biceps kit ) (Right: Elbow) Diagnosis:       Rupture of right distal biceps tendon, initial encounter      (Rupture of right distal biceps tendon, initial encounter [S46.211A])    Surgeons: Renny Mendez MD Responsible Provider: Florian Rivera DO    Anesthesia Type: general ASA Status: 3            Anesthesia Type: general    Vitals Value Taken Time   /86 06/19/25 14:00   Temp 36.6 06/19/25 16:42   Pulse 82 06/19/25 14:00   Resp 16 06/19/25 14:00   SpO2 99 % 06/19/25 14:00       Anesthesia Post Evaluation    Patient location during evaluation: PACU  Patient participation: complete - patient participated  Level of consciousness: awake  Pain management: adequate  Multimodal analgesia pain management approach  Airway patency: patent  Cardiovascular status: acceptable and hemodynamically stable  Respiratory status: acceptable and spontaneous ventilation  Hydration status: euvolemic  Postoperative Nausea and Vomiting: none        There were no known notable events for this encounter.

## 2025-06-19 NOTE — OP NOTE
Repair distal biceps ** OK to add per Omar ** ( preop block, supine hand table, arthrex distal biceps kit ) (R) Operative Note     Date: 2025  OR Location: [facility]    Name: Clarisa Barboza, : 1970, Age: 55 y.o., MRN: [id number], Sex: female    Diagnosis  Pre-op Diagnosis      * Rupture of right distal biceps tendon, initial encounter [S4A] Post-op Diagnosis     * Rupture of right distal biceps tendon, initial encounter [S4]     Procedures  Repair distal biceps ** OK to add per Omar ** ( preop block, supine hand table, arthrex distal biceps kit )  [id number] - GA RINSJ RPTD BICEPS/TRICEPS TDN DSTL W/WO TDN [facility]      Surgeons      * Renny Mendez - Primary    Resident/Fellow/Other Assistant:  Surgeons and Role:  * No surgeons found with a matching role *    Staff:   Circulator: Rose Reaves Person: Alecia Juarezub Person: Joan Reaves Person: Navya    Anesthesia Staff: Anesthesiologist: Florian Rivera DO  CRNA: LIZZY Taylor-CRNA    Procedure Summary  Anesthesia: General  ASA: III  Estimated Blood Loss: 25 mL  Intra-op Medications: Administrations occurring from 1245 to 1420 on 25:  * No intraprocedure medications in log *        Specimen: No specimens collected              Drains and/or Catheters: * None in log *    Tourniquet Times:     Total Tourniquet Time Documented:  Arm - Upper (Right) - 31 minutes  Total: Arm - Upper (Right) - 31 minutes      Implants:     INDICATIONS FOR PROCEDURE: The patient had significant pain and weakness in the affected extremity which was refractory to nonoperative measures and was diagnosed with a high-grade partial thickness distal biceps rupture of the right elbow. Treatment options were discussed. The patient was proposed to have a right distal biceps repair. Risks and benefits of surgery and alternatives of treatment were discussed.  The patient understood all the risks and benefits and wanted to proceed with surgical option.    DESCRIPTION OF  PROCEDURE: Patient was met in the preoperative holding area. Consent for surgery was reviewed and the correct operative extremity was verified and marked. The patient was then brought to the operating room and was placed in a supine position on the operating table.  SCDs were placed for DVT prophylaxis. General anesthesia was induced. A hand table was placed, and all the bony prominences were well padded. A nonsterile tourniquet was placed proximally. The operative upper extremity was prepped and draped in usual sterile fashion.  A time out was held confirming the correct patient, operative side, operative procedure, preoperative antibiotics, implants being present and that it was safe to proceed--all were in agreement it was safe to proceed.    Attention was directed to the right upper extremity.  A longitudinal incision was made just distal to the elbow flexion crease in the midline of the forearm.  Sharp dissection was carried out through subcutaneous tissue and skin using knife and electrocautery.  Careful dissection was then taking with a Metzenbaum scissors through the fatty layer, and the fascia was identified.  The fascia was then carefully incised.  Care was taken to protect the lateral antebrachial cutaneous nerve.  The interval between brachioradialis and pronator teres was then developed bluntly.  Crossing veins were mobilized medially and laterally.  Small crossing veins that were crossing right over the surgical site were carefully cauterized using the bipolar electrocautery.  The radial tuberosity was then identified.  Care was taken to protect the PIN retractors.  Using electrocautery and blunt elevators, the radial tuberosity was cleared of all soft tissue and visualized.  At this point, attention was directed to finding the distal biceps tendon.  There was a high-grade tear with only few fibers remaining.  This was released.     The biceps tendon was then whipstitched using an Arthrex fiber loop  suture.  This provided excellent fixation.  The 2 ends of the suture were then loaded onto a Arthrex cortical button.  Attention was then directed distally and the radial tuberosity was then visualized excellently.  A guidewire was placed bicortically in the midpoint of the radial tuberosity.  The tendon was sized to a size 7 and therefore a size 7 reamer was used to create a unicortical hole.  Copious irrigation was performed to remove all bony debris.  The biceps tendon button was then advanced bicortically and using alternating tension, the button was secured on the far cortex.  With gradual flexion of the arm and advancement of the tendon, the biceps tendon was then docked into the radius tunnel.  Excellent fixation was achieved.  1 limb of the suture was then passed through the biceps tendon and tied to the other limb to secure the cortical fixation.  Again, excellent fixation was achieved without sliding of the tendon.    The wound was then thoroughly irrigated again of all debris.  The wound was packed in the tourniquet was then taken down.  No significant bleeding was encountered.    Wounds were then closed with 0 Vicryl deep, 2-0 Vicryl in the subcutaneous layer and 3-0 Monocryl for the skin.  Steri-Strips, Xeroform, fluffs, web roll, posterior slab splint was placed in 90 degrees of elbow flexion.  A sling was placed.    Following the procedure, the patient was satisfactorily awakened from anesthesia and transferred to the postanesthesia care unit in stable condition.     ATTESTATION: Dr. Mendez was present for the entirety of the procedure and personally performed all aspects of this procedure.    Dragon software was used to dictate this note, please be aware that minor errors in transcription may be present.      Evidence of Infection: No   Complications:  None; patient tolerated the procedure well.    Disposition: PACU - hemodynamically stable.  Condition: stable     Attending Attestation: I was present  and scrubbed for the entire procedure.    Renny Mendez  Phone Number: [phone]

## 2025-06-19 NOTE — ANESTHESIA PROCEDURE NOTES
Peripheral Block    Patient location during procedure: pre-op  Medication administered at: 6/19/2025 12:43 PM  End time: 6/19/2025 12:45 PM  Reason for block: at surgeon's request and post-op pain management  Staffing  Performed: attending   Authorized by: Florian Rivera DO    Performed by: Santana Ceron MD  Preanesthetic Checklist  Completed: patient identified, IV checked, site marked, risks and benefits discussed, surgical consent, monitors and equipment checked, pre-op evaluation and timeout performed   Timeout performed at: 6/19/2025 12:38 PM  Peripheral Block  Patient position: laying flat  Prep: ChloraPrep  Patient monitoring: heart rate, cardiac monitor and continuous pulse ox  Block type: supraclavicular  Laterality: right  Injection technique: single-shot  Guidance: ultrasound guided  Needle  Needle type: short-bevel   Needle gauge: 22 G  Needle length: 5 cm  Needle localization: ultrasound guidance     image stored in chart  Test dose: negative  Assessment  Injection assessment: no paresthesia on injection, negative aspiration for heme, incremental injection and local visualized surrounding nerve on ultrasound  Paresthesia pain: none  Heart rate change: no  Slow fractionated injection: yes  Additional Notes  Ropivicaine 20 ml 0.5% with 5 mg decadron preservative free.    Patient able to flex and extend hand post nerve block.    Ultrasound guidance used-- able to visualize needle, Nerve roots and local anesthetic  filling around the nerve roots as it is injected.    Medications Administered  dexAMETHasone (Decadron) injection - perineural injection   5 mg - 6/19/2025 12:43:00 PM  dexmedeTOMIDine (Precedex) 4 mcg/mL in 0.9 % NaCL syringe - perineural injection   4 mcg - 6/19/2025 12:43:00 PM  ropivacaine (NAROPIN) 5 MG/ML Perineural - perineural injection   20 mL - 6/19/2025 12:43:00 PM

## 2025-06-19 NOTE — PERIOPERATIVE NURSING NOTE
Pt alert, talkative, tolerating PO. Meets all other criteria for PH2 and DC planning. Uneventful recovery.

## 2025-06-20 ENCOUNTER — HOSPITAL ENCOUNTER (EMERGENCY)
Facility: HOSPITAL | Age: 55
Discharge: HOME | End: 2025-06-20
Payer: COMMERCIAL

## 2025-06-20 VITALS
HEIGHT: 60 IN | RESPIRATION RATE: 18 BRPM | HEART RATE: 78 BPM | WEIGHT: 176 LBS | DIASTOLIC BLOOD PRESSURE: 91 MMHG | OXYGEN SATURATION: 96 % | TEMPERATURE: 98.1 F | BODY MASS INDEX: 34.55 KG/M2 | SYSTOLIC BLOOD PRESSURE: 139 MMHG

## 2025-06-20 DIAGNOSIS — G89.18 POSTOPERATIVE PAIN: Primary | ICD-10-CM

## 2025-06-20 PROCEDURE — 2500000001 HC RX 250 WO HCPCS SELF ADMINISTERED DRUGS (ALT 637 FOR MEDICARE OP)

## 2025-06-20 PROCEDURE — 2500000004 HC RX 250 GENERAL PHARMACY W/ HCPCS (ALT 636 FOR OP/ED): Mod: JZ

## 2025-06-20 PROCEDURE — 96372 THER/PROPH/DIAG INJ SC/IM: CPT

## 2025-06-20 PROCEDURE — 99284 EMERGENCY DEPT VISIT MOD MDM: CPT

## 2025-06-20 RX ORDER — HYDROMORPHONE HYDROCHLORIDE 1 MG/ML
1 INJECTION, SOLUTION INTRAMUSCULAR; INTRAVENOUS; SUBCUTANEOUS ONCE
Status: COMPLETED | OUTPATIENT
Start: 2025-06-20 | End: 2025-06-20

## 2025-06-20 RX ORDER — OXYCODONE AND ACETAMINOPHEN 5; 325 MG/1; MG/1
2 TABLET ORAL ONCE
Refills: 0 | Status: COMPLETED | OUTPATIENT
Start: 2025-06-20 | End: 2025-06-20

## 2025-06-20 RX ADMIN — HYDROMORPHONE HYDROCHLORIDE 1 MG: 1 INJECTION, SOLUTION INTRAMUSCULAR; INTRAVENOUS; SUBCUTANEOUS at 18:27

## 2025-06-20 RX ADMIN — OXYCODONE HYDROCHLORIDE AND ACETAMINOPHEN 2 TABLET: 5; 325 TABLET ORAL at 17:20

## 2025-06-20 ASSESSMENT — PAIN SCALES - GENERAL
PAINLEVEL_OUTOF10: 9
PAINLEVEL_OUTOF10: 8

## 2025-06-20 ASSESSMENT — PAIN DESCRIPTION - FREQUENCY: FREQUENCY: CONSTANT/CONTINUOUS

## 2025-06-20 ASSESSMENT — PAIN DESCRIPTION - DESCRIPTORS: DESCRIPTORS: ACHING;SHARP;STABBING

## 2025-06-20 ASSESSMENT — PAIN - FUNCTIONAL ASSESSMENT: PAIN_FUNCTIONAL_ASSESSMENT: 0-10

## 2025-06-20 ASSESSMENT — PAIN DESCRIPTION - ONSET: ONSET: SUDDEN

## 2025-06-20 ASSESSMENT — PAIN DESCRIPTION - LOCATION: LOCATION: ARM

## 2025-06-20 ASSESSMENT — PAIN DESCRIPTION - PROGRESSION: CLINICAL_PROGRESSION: NOT CHANGED

## 2025-06-20 ASSESSMENT — PAIN DESCRIPTION - ORIENTATION: ORIENTATION: RIGHT

## 2025-06-22 NOTE — ED PROVIDER NOTES
HPI   Chief Complaint   Patient presents with    Post-op Problem     I have a bicep repair  yesterday and the pain is sharp and aching throbbing        HPI  Patient is a 55-year-old female presents ED for chief complaint of right arm pain.  Patient had a biceps tendon repair yesterday.  She reports poorly controlled pain since coming home.  Patient states she took 2 oxycodone prior to arrival.  Denies any fever or chills.  Denies any other acute complaints.      Patient History   Medical History[1]  Surgical History[2]  Family History[3]  Social History[4]    Physical Exam   ED Triage Vitals [06/20/25 1605]   Temperature Heart Rate Respirations BP   36.7 °C (98.1 °F) 78 18 (!) 139/91      Pulse Ox Temp Source Heart Rate Source Patient Position   96 % Oral Monitor Sitting      BP Location FiO2 (%)     Left arm --       Physical Exam  Vitals reviewed.   Constitutional:       General: She is not in acute distress.     Appearance: Normal appearance. She is not ill-appearing.   HENT:      Head: Normocephalic and atraumatic.   Eyes:      Extraocular Movements: Extraocular movements intact.   Cardiovascular:      Rate and Rhythm: Normal rate and regular rhythm.      Heart sounds: Normal heart sounds.   Pulmonary:      Effort: Pulmonary effort is normal.      Breath sounds: Normal breath sounds.   Abdominal:      Palpations: Abdomen is soft.      Tenderness: There is no abdominal tenderness.   Musculoskeletal:         General: Normal range of motion.      Cervical back: Normal range of motion and neck supple.   Skin:     General: Skin is warm and dry.   Neurological:      General: No focal deficit present.      Mental Status: She is alert and oriented to person, place, and time.   Psychiatric:         Mood and Affect: Mood normal.         Behavior: Behavior normal.    ED Course & MDM   Diagnoses as of 06/22/25 1451   Postoperative pain                 No data recorded     Llano Coma Scale Score: 15 (06/20/25 1631 : Venus  JENNIFER Garcia)                           Medical Decision Making  Parts of this chart have been completed using voice recognition software. Please excuse any errors of transcription.  My thought process and reason for plan has been formulated from the time that I saw the patient until the time of disposition and is not specific to one specific moment during their visit and furthermore my MDM encompasses this entire chart and not only this text box.    HPI:   A medically appropriate HPI was obtained, outlined above.    Clarisa Barboza is a  55 y.o. female    Chief Complaint   Patient presents with    Post-op Problem     I have a bicep repair  yesterday and the pain is sharp and aching throbbing        Medical History[5]    Surgical History[6]    Social History[7]    Family History[8]    Allergies[9]    Current Outpatient Medications   Medication Instructions    acetaminophen (TYLENOL EXTRA STRENGTH) 1,000 mg, oral, Every 6 hours PRN    acetaminophen (TYLENOL) 1,000 mg, Every 8 hours PRN    ALPRAZolam (Xanax) 0.25 mg tablet Take 1 tablet by mouth nightly at bedtime as needed for sleep    aspirin 81 mg, oral, Daily    buPROPion XL (Wellbutrin XL) 150 mg 24 hr tablet Take 1 Tablet by mouth every morning    buPROPion XL (WELLBUTRIN XL) 300 mg, oral, Daily before breakfast    docusate sodium (COLACE) 100 mg, oral, 2 times daily, As needed for constipation    DULoxetine (CYMBALTA) 60 mg, oral, 2 times daily    lamoTRIgine (LAMICTAL) 400 mg, oral, Daily    latanoprost (Xalatan) 0.005 % ophthalmic solution 1 drop, Daily    lurasidone (LATUDA) 20 mg, oral, Daily with evening meal    [START ON 7/12/2025] methylphenidate ER 36 mg, oral, 2 times daily    methylphenidate ER 36 mg, oral, 2 times daily    OLANZapine (ZYPREXA) 1.25 mg, oral, 3 times daily    ondansetron (ZOFRAN) 4 mg, oral, Every 8 hours PRN, As needed for nausea    oxyCODONE (ROXICODONE) 5 mg, oral, Every 6 hours PRN    topiramate (TOPAMAX) 50 mg, oral, Nightly    vit  A/vit C/vit E/zinc/copper (ICAPS AREDS ORAL) 500 mg, Daily   for details    Exam:   No data found.    A medically appropriate exam performed, outlined above, given the known history and presentation.    EKG/Cardiac monitor:   If EKG was done and, it was interpreted by attending physician, see their note for ED course for more detail.    Medications given during visit:  Medications   oxyCODONE-acetaminophen (Percocet) 5-325 mg per tablet 2 tablet (2 tablets oral Given 6/20/25 1720)   HYDROmorphone (Dilaudid) injection 1 mg (1 mg intramuscular Given 6/20/25 1827)        Diagnostic/tests:  Labs Reviewed - No data to display     No orders to display          MDM Summary:  Significant improvement of pain prior to discharge.  Patient stable for discharge at this time.    We have discussed the diagnosis and risks, and we agree with discharging home to follow-up with appropriate physician as directed. We also discussed returning to the Emergency Department immediately if new or worsening symptoms occur. We have discussed the symptoms which are most concerning that necessitate immediate return. Pt symptoms have been well controlled here and the patient is safe for discharge with appropriate outpatient follow up. The patient has verbalized understanding to return to ER without delay for new or worsening pains or for any other symptoms or concerns. I utilized the discharge clinical management tool provided [employer] to help estimate risk of negative outcome for this patient.        Disposition:  ED Prescriptions    None         All of the patient's questions were answered to the best of my ability. Patient states understanding that they have been screened for an emergency today and we have not found any etiology of symptoms that requires emergent treatment or admission to the hospital at this point. They understand that they have not had definitive care day and require follow-up for treatment of their condition.  "They also state understanding that they may have an emergent condition that may potentially have not of detected at this visit and they must return to the emergency department if they develop any worsening of symptoms or new complaints.       Procedure  Procedures       [1]   Past Medical History:  Diagnosis Date    Adverse effect of anesthesia     STATES \"HAD VERSED AND WOKE UP CRYING\"    Ankle sprain 1988    Anxiety     Bipolar disorder     Bunion     Cataract 2022    CPAP (continuous positive airway pressure) dependence     CTS (carpal tunnel syndrome) 2007    Delayed emergence from general anesthesia     Depression     Fracture of ankle 2013    Fracture of wrist 2020    Fracture, foot 1985    Glaucoma 2022    Ocular hypertension    Obesity     Due to taking Olanzapine which increases hunger and carb cravings    Personal history of other diseases of the nervous system and sense organs     History of sleep apnea    Skin disorder 2025    Eczema    Sleep apnea    [2]   Past Surgical History:  Procedure Laterality Date    CARPAL TUNNEL RELEASE  06/10/2013    Neuroplasty Decompression Median Nerve At Carpal Tunnel    HAND SURGERY  06/10/2013    Hand Surgery                                                                                                                                                          MOUTH SURGERY  06/10/2013    Oral Surgery Tooth Extraction    TONSILLECTOMY  06/10/2013    Tonsillectomy    TRIGGER FINGER RELEASE  06/10/13    Right thumb    WISDOM TOOTH EXTRACTION  01/01/1989   [3]   Family History  Problem Relation Name Age of Onset    Arthritis Mother Mom     Depression Mother Mom     Hearing loss Mother Mom     Hyperlipidemia Mother Mom     Osteoporosis Mother Mom     Cancer Mother Mom     Alcohol abuse Father Dad     Depression Father Dad     Hearing loss Father Dad     Heart disease Father Dad         CABG    Aortic aneurysm Father Dad         ABDOMINAL    Peripheral vascular disease Father " "Dad     Transient ischemic attack Father Dad     Arthritis Father Dad     Cancer Mother's Sister Aunt Gladys     Depression Maternal Grandmother [place]     Cancer Paternal Grandmother Louisa     Hearing loss Paternal Grandfather Grandpa     Heart disease Paternal Grandfather Grandpa     Broken bones Brother Deandre     Broken bones Brother Deandre     Broken bones Sister Deshawn     Broken bones Brother Pastor     Cancer Other Magi Rawlins (maternal aunt)     Cancer Other Felicia Urick (mayernal aunt)     Vision loss Other Felicia Urick (mayernal aunt)     Cancer Other Anne Oconnor (maternal first cousin)     Depression Other Anne Oconnor (maternal first cousin)     Depression Other Marlon Goodrichorth (maternal second cousin)     Motion Sickness Other Clarisa (self)    [4]   Social History  Tobacco Use    Smoking status: Never    Smokeless tobacco: Never   Vaping Use    Vaping status: Never Used   Substance Use Topics    Alcohol use: Never     Comment: Occasionally IN PAST    Drug use: Never   [5]   Past Medical History:  Diagnosis Date    Adverse effect of anesthesia     STATES \"HAD VERSED AND WOKE UP CRYING\"    Ankle sprain 1988    Anxiety     Bipolar disorder     Bunion     Cataract 2022    CPAP (continuous positive airway pressure) dependence     CTS (carpal tunnel syndrome) 2007    Delayed emergence from general anesthesia     Depression     Fracture of ankle 2013    Fracture of wrist 2020    Fracture, foot 1985    Glaucoma 2022    Ocular hypertension    Obesity     Due to taking Olanzapine which increases hunger and carb cravings    Personal history of other diseases of the nervous system and sense organs     History of sleep apnea    Skin disorder 2025    Eczema    Sleep apnea    [6]   Past Surgical History:  Procedure Laterality Date    CARPAL TUNNEL RELEASE  06/10/2013    Neuroplasty Decompression Median Nerve At Carpal Tunnel    HAND SURGERY  06/10/2013    Hand Surgery                                                                    "                                                                                       MOUTH SURGERY  06/10/2013    Oral Surgery Tooth Extraction    TONSILLECTOMY  06/10/2013    Tonsillectomy    TRIGGER FINGER RELEASE  06/10/13    Right thumb    WISDOM TOOTH EXTRACTION  01/01/1989   [7]   Social History  Tobacco Use    Smoking status: Never    Smokeless tobacco: Never   Vaping Use    Vaping status: Never Used   Substance Use Topics    Alcohol use: Never     Comment: Occasionally IN PAST    Drug use: Never   [8]   Family History  Problem Relation Name Age of Onset    Arthritis Mother Mom     Depression Mother Mom     Hearing loss Mother Mom     Hyperlipidemia Mother Mom     Osteoporosis Mother Mom     Cancer Mother Mom     Alcohol abuse Father Dad     Depression Father Dad     Hearing loss Father Dad     Heart disease Father Dad         CABG    Aortic aneurysm Father Dad         ABDOMINAL    Peripheral vascular disease Father Dad     Transient ischemic attack Father Dad     Arthritis Father Dad     Cancer Mother's Sister Aunt Gladys     Depression Maternal Grandmother Cayla     Cancer Paternal Grandmother Louisa     Hearing loss Paternal Grandfather Grandpa     Heart disease Paternal Grandfather Grandpa     Broken bones Brother Deandre     Broken bones Brother Deandre     Broken bones Sister Deshawn     Broken bones Brother Pastor     Cancer Other Magi Michael (maternal aunt)     Cancer Other Felicia Urick (mayernal aunt)     Vision loss Other Felicia Urick (mayernal aunt)     Cancer Other Anne Oconnor (maternal first cousin)     Depression Other Anne Oconnor (maternal first cousin)     Depression Other Marlon Oconnor (maternal second cousin)     Motion Sickness Other Clarisa (self)    [9]   Allergies  Allergen Reactions    Sulfa (Sulfonamide Antibiotics) Moses Melton PA-C  06/22/25 2551

## 2025-06-23 ENCOUNTER — PHARMACY VISIT (OUTPATIENT)
Dept: PHARMACY | Facility: CLINIC | Age: 55
End: 2025-06-23
Payer: COMMERCIAL

## 2025-06-23 DIAGNOSIS — S46.211A RUPTURE OF RIGHT DISTAL BICEPS TENDON, INITIAL ENCOUNTER: ICD-10-CM

## 2025-06-23 PROCEDURE — RXMED WILLOW AMBULATORY MEDICATION CHARGE

## 2025-06-23 PROCEDURE — RXOTC WILLOW AMBULATORY OTC CHARGE

## 2025-06-23 RX ORDER — OXYCODONE HYDROCHLORIDE 5 MG/1
5 TABLET ORAL EVERY 6 HOURS PRN
Qty: 28 TABLET | Refills: 0 | Status: SHIPPED | OUTPATIENT
Start: 2025-06-23 | End: 2025-06-27 | Stop reason: SDUPTHER

## 2025-06-23 RX ORDER — ALPRAZOLAM 0.25 MG/1
0.25 TABLET ORAL NIGHTLY PRN
Qty: 30 TABLET | Refills: 1 | OUTPATIENT
Start: 2025-06-23

## 2025-06-23 RX ORDER — METHYLPHENIDATE HYDROCHLORIDE 36 MG/1
TABLET ORAL
Qty: 60 TABLET | Refills: 0 | OUTPATIENT
Start: 2025-06-23

## 2025-06-23 RX ORDER — METHYLPHENIDATE HYDROCHLORIDE 36 MG/1
36 TABLET ORAL 2 TIMES DAILY
Qty: 60 TABLET | Refills: 0 | OUTPATIENT
Start: 2025-06-23

## 2025-06-25 ENCOUNTER — APPOINTMENT (OUTPATIENT)
Dept: OBSTETRICS AND GYNECOLOGY | Facility: CLINIC | Age: 55
End: 2025-06-25
Payer: COMMERCIAL

## 2025-06-27 ENCOUNTER — PHARMACY VISIT (OUTPATIENT)
Dept: PHARMACY | Facility: CLINIC | Age: 55
End: 2025-06-27
Payer: COMMERCIAL

## 2025-06-27 DIAGNOSIS — S46.211A RUPTURE OF RIGHT DISTAL BICEPS TENDON, INITIAL ENCOUNTER: ICD-10-CM

## 2025-06-27 PROCEDURE — RXMED WILLOW AMBULATORY MEDICATION CHARGE

## 2025-06-27 RX ORDER — OXYCODONE HYDROCHLORIDE 5 MG/1
5 TABLET ORAL EVERY 6 HOURS PRN
Qty: 28 TABLET | Refills: 0 | Status: SHIPPED | OUTPATIENT
Start: 2025-06-27 | End: 2025-07-04

## 2025-06-30 PROCEDURE — RXMED WILLOW AMBULATORY MEDICATION CHARGE

## 2025-07-02 ENCOUNTER — OFFICE VISIT (OUTPATIENT)
Dept: ORTHOPEDIC SURGERY | Facility: CLINIC | Age: 55
End: 2025-07-02
Payer: COMMERCIAL

## 2025-07-02 DIAGNOSIS — S46.219S: ICD-10-CM

## 2025-07-02 DIAGNOSIS — S46.211A RUPTURE OF RIGHT DISTAL BICEPS TENDON, INITIAL ENCOUNTER: Primary | ICD-10-CM

## 2025-07-02 PROCEDURE — 99024 POSTOP FOLLOW-UP VISIT: CPT | Performed by: STUDENT IN AN ORGANIZED HEALTH CARE EDUCATION/TRAINING PROGRAM

## 2025-07-02 PROCEDURE — 99212 OFFICE O/P EST SF 10 MIN: CPT | Performed by: STUDENT IN AN ORGANIZED HEALTH CARE EDUCATION/TRAINING PROGRAM

## 2025-07-02 ASSESSMENT — PAIN - FUNCTIONAL ASSESSMENT: PAIN_FUNCTIONAL_ASSESSMENT: 0-10

## 2025-07-02 ASSESSMENT — PAIN SCALES - GENERAL: PAINLEVEL_OUTOF10: 1

## 2025-07-02 NOTE — PROGRESS NOTES
History: Patient returns to the office status post R DBR on 2 weeks ago. Patient has been immobilized in a sling and pain is well controlled. Denies numbness/tingling.     Past medical history, past surgical history, medications, allergies, family history, social history, and review of systems were reviewed today and have been documented separately in this encounter.   A 12 point review of systems was negative other than as stated in the HPI.    Physical Examination:    On exam of the right upper extremity, incisions are well healed, without significant erythema or drainage, sutures were removed today. Demonstrates full ROM of the wrist/hand. Neurovascularly intact throughout.    Assessment: 2 weeks s/p R DBR     Plan: Removable elbow brace given, plan for stretching. Follow up in 4 weeks.     Dragon software was used to dictate this note, please be aware that minor errors in transcription may be present.    Renny Mendez MD    Shoulder/Elbow Surgery  [facility] [place]

## 2025-07-07 ENCOUNTER — PHARMACY VISIT (OUTPATIENT)
Dept: PHARMACY | Facility: CLINIC | Age: 55
End: 2025-07-07
Payer: COMMERCIAL

## 2025-07-17 ENCOUNTER — PHARMACY VISIT (OUTPATIENT)
Dept: PHARMACY | Facility: CLINIC | Age: 55
End: 2025-07-17
Payer: COMMERCIAL

## 2025-07-17 PROCEDURE — RXOTC WILLOW AMBULATORY OTC CHARGE

## 2025-07-17 PROCEDURE — RXMED WILLOW AMBULATORY MEDICATION CHARGE

## 2025-08-05 ENCOUNTER — APPOINTMENT (OUTPATIENT)
Dept: OBSTETRICS AND GYNECOLOGY | Facility: CLINIC | Age: 55
End: 2025-08-05
Payer: COMMERCIAL

## 2025-08-06 ENCOUNTER — OFFICE VISIT (OUTPATIENT)
Dept: ORTHOPEDIC SURGERY | Facility: CLINIC | Age: 55
End: 2025-08-06
Payer: COMMERCIAL

## 2025-08-06 DIAGNOSIS — S46.211A RUPTURE OF RIGHT DISTAL BICEPS TENDON, INITIAL ENCOUNTER: Primary | ICD-10-CM

## 2025-08-06 PROCEDURE — 99024 POSTOP FOLLOW-UP VISIT: CPT | Performed by: STUDENT IN AN ORGANIZED HEALTH CARE EDUCATION/TRAINING PROGRAM

## 2025-08-06 PROCEDURE — 99212 OFFICE O/P EST SF 10 MIN: CPT | Performed by: STUDENT IN AN ORGANIZED HEALTH CARE EDUCATION/TRAINING PROGRAM

## 2025-08-06 ASSESSMENT — PAIN - FUNCTIONAL ASSESSMENT: PAIN_FUNCTIONAL_ASSESSMENT: 0-10

## 2025-08-06 ASSESSMENT — PAIN SCALES - GENERAL: PAINLEVEL_OUTOF10: 2

## 2025-08-06 NOTE — PROGRESS NOTES
History: Patient returns to the office status post R DBR on 6 weeks ago. Patient has been in a elbow brace and pain is well controlled. Denies numbness/tingling.     Past medical history, past surgical history, medications, allergies, family history, social history, and review of systems were reviewed today and have been documented separately in this encounter.   A 12 point review of systems was negative other than as stated in the HPI.    Physical Examination:    On exam of the right upper extremity, incisions are well healed, without significant erythema or drainage. Demonstrates full ROM of the wrist/hand. Neurovascularly intact throughout. Elbow ROM 0-150    Assessment: 6 weeks s/p R DBR     Plan:    Patient progressing as expected. Start PT and progress ROM as tolerated, strengthening after ROM is full. We will see them back around 3 months postoperatively. All questions answered.     Dragon software was used to dictate this note, please be aware that minor errors in transcription may be present.    Renny Mendez MD    Shoulder/Elbow Surgery  [facility] [place]

## 2025-08-12 ENCOUNTER — EVALUATION (OUTPATIENT)
Dept: PHYSICAL THERAPY | Facility: CLINIC | Age: 55
End: 2025-08-12
Payer: COMMERCIAL

## 2025-08-12 DIAGNOSIS — S46.211D RUPTURE OF RIGHT DISTAL BICEPS TENDON, SUBSEQUENT ENCOUNTER: Primary | ICD-10-CM

## 2025-08-12 PROCEDURE — 97140 MANUAL THERAPY 1/> REGIONS: CPT | Mod: GP | Performed by: PHYSICAL THERAPIST

## 2025-08-12 PROCEDURE — 97161 PT EVAL LOW COMPLEX 20 MIN: CPT | Mod: GP | Performed by: PHYSICAL THERAPIST

## 2025-08-12 PROCEDURE — 97110 THERAPEUTIC EXERCISES: CPT | Mod: GP | Performed by: PHYSICAL THERAPIST

## 2025-08-15 ENCOUNTER — PHARMACY VISIT (OUTPATIENT)
Dept: PHARMACY | Facility: CLINIC | Age: 55
End: 2025-08-15
Payer: COMMERCIAL

## 2025-08-15 ENCOUNTER — TREATMENT (OUTPATIENT)
Dept: PHYSICAL THERAPY | Facility: CLINIC | Age: 55
End: 2025-08-15
Payer: COMMERCIAL

## 2025-08-15 DIAGNOSIS — S46.211D RUPTURE OF RIGHT DISTAL BICEPS TENDON, SUBSEQUENT ENCOUNTER: Primary | ICD-10-CM

## 2025-08-15 PROCEDURE — RXMED WILLOW AMBULATORY MEDICATION CHARGE

## 2025-08-15 PROCEDURE — 97110 THERAPEUTIC EXERCISES: CPT | Mod: GP

## 2025-08-15 PROCEDURE — 97140 MANUAL THERAPY 1/> REGIONS: CPT | Mod: GP

## 2025-08-19 DIAGNOSIS — Z12.31 ENCOUNTER FOR SCREENING MAMMOGRAM FOR BREAST CANCER: ICD-10-CM

## 2025-08-20 ENCOUNTER — TREATMENT (OUTPATIENT)
Dept: PHYSICAL THERAPY | Facility: CLINIC | Age: 55
End: 2025-08-20
Payer: COMMERCIAL

## 2025-08-20 DIAGNOSIS — S46.211D RUPTURE OF RIGHT DISTAL BICEPS TENDON, SUBSEQUENT ENCOUNTER: ICD-10-CM

## 2025-08-20 PROCEDURE — 97110 THERAPEUTIC EXERCISES: CPT | Mod: GP,CQ

## 2025-08-20 PROCEDURE — RXMED WILLOW AMBULATORY MEDICATION CHARGE

## 2025-08-20 PROCEDURE — 97140 MANUAL THERAPY 1/> REGIONS: CPT | Mod: GP,CQ

## 2025-08-22 ENCOUNTER — PHARMACY VISIT (OUTPATIENT)
Dept: PHARMACY | Facility: CLINIC | Age: 55
End: 2025-08-22
Payer: COMMERCIAL

## 2025-08-22 ENCOUNTER — TREATMENT (OUTPATIENT)
Dept: PHYSICAL THERAPY | Facility: CLINIC | Age: 55
End: 2025-08-22
Payer: COMMERCIAL

## 2025-08-22 DIAGNOSIS — S46.211D RUPTURE OF RIGHT DISTAL BICEPS TENDON, SUBSEQUENT ENCOUNTER: ICD-10-CM

## 2025-08-22 PROCEDURE — 97140 MANUAL THERAPY 1/> REGIONS: CPT | Mod: GP | Performed by: PHYSICAL THERAPIST

## 2025-08-22 PROCEDURE — 97110 THERAPEUTIC EXERCISES: CPT | Mod: GP | Performed by: PHYSICAL THERAPIST

## 2025-08-22 PROCEDURE — RXOTC WILLOW AMBULATORY OTC CHARGE

## 2025-08-25 ENCOUNTER — TREATMENT (OUTPATIENT)
Dept: PHYSICAL THERAPY | Facility: CLINIC | Age: 55
End: 2025-08-25
Payer: COMMERCIAL

## 2025-08-25 DIAGNOSIS — S46.211D RUPTURE OF RIGHT DISTAL BICEPS TENDON, SUBSEQUENT ENCOUNTER: ICD-10-CM

## 2025-08-25 PROCEDURE — 97140 MANUAL THERAPY 1/> REGIONS: CPT | Mod: GP,CQ

## 2025-08-25 PROCEDURE — 97110 THERAPEUTIC EXERCISES: CPT | Mod: GP,CQ

## 2025-08-27 ENCOUNTER — APPOINTMENT (OUTPATIENT)
Dept: PHYSICAL THERAPY | Facility: CLINIC | Age: 55
End: 2025-08-27
Payer: COMMERCIAL

## 2025-09-05 ENCOUNTER — TREATMENT (OUTPATIENT)
Dept: PHYSICAL THERAPY | Facility: CLINIC | Age: 55
End: 2025-09-05
Payer: COMMERCIAL

## 2025-09-05 DIAGNOSIS — S46.211D RUPTURE OF RIGHT DISTAL BICEPS TENDON, SUBSEQUENT ENCOUNTER: ICD-10-CM

## 2025-09-05 PROCEDURE — 97110 THERAPEUTIC EXERCISES: CPT | Mod: GP | Performed by: PHYSICAL THERAPIST

## 2025-09-05 PROCEDURE — 97140 MANUAL THERAPY 1/> REGIONS: CPT | Mod: GP | Performed by: PHYSICAL THERAPIST

## (undated) DEVICE — PENCIL, ELECTROSURG, W/BUTTON SWITCH & HOLSTER, EZ CLEAN, DISP

## (undated) DEVICE — SUTURE, PROLENE, 3-0, 18 IN, PS2, BLUE

## (undated) DEVICE — CUFF, TOURNIQUET, 18 X 4, DUAL PORT/SNGL BLADDER, DISP, LF

## (undated) DEVICE — SUTURE, VICRYL, 0, 18 IN, CT-1, VIOLET

## (undated) DEVICE — GOWN, SURGICAL, SIRUS, NON REINFORCED, LARGE

## (undated) DEVICE — STRIP, SKIN CLOSURE, STERI STRIP, REINFORCED, 0.5 X 4 IN

## (undated) DEVICE — TIP, SUCTION, YANKAUER, BULB, ADULT

## (undated) DEVICE — Device

## (undated) DEVICE — SUTURE, VICRYL, 2-0, 27 IN, SH, UNDYED

## (undated) DEVICE — TUBING, SUCTION, 6MM X 10, CLEAN N-COND

## (undated) DEVICE — BLANKET, LOWER BODY, VHA PLUS, ADULT

## (undated) DEVICE — DRESSING, NON-ADHERENT, 3 X 3 IN, STERILE

## (undated) DEVICE — DRESSING, ABDOMINAL, TENDERSORB, 8 X 7-1/2 IN, STERILE

## (undated) DEVICE — PADDING, UNDERCAST, WEBRIL, 4 IN X 4 YD, REG, NS

## (undated) DEVICE — SYSTEM, IMPLANT DELIVERY, BIOCOMPOSITE, DISTAL BICEPS REPAIR

## (undated) DEVICE — SOLUTION, IRRIGATION, X RX SODIUM CHL 0.9%, 1000ML BTL

## (undated) DEVICE — SUTURE, MONOCRYL, 3-0, 27 IN, PS-2, UNDYED